# Patient Record
Sex: FEMALE | Race: WHITE | NOT HISPANIC OR LATINO | Employment: OTHER | URBAN - METROPOLITAN AREA
[De-identification: names, ages, dates, MRNs, and addresses within clinical notes are randomized per-mention and may not be internally consistent; named-entity substitution may affect disease eponyms.]

---

## 2017-02-07 ENCOUNTER — OFFICE VISIT - HEALTHEAST (OUTPATIENT)
Dept: SURGERY | Facility: CLINIC | Age: 62
End: 2017-02-07

## 2017-02-07 DIAGNOSIS — L72.3 INFECTED SEBACEOUS CYST: ICD-10-CM

## 2017-02-07 DIAGNOSIS — L08.9 INFECTED SEBACEOUS CYST: ICD-10-CM

## 2017-03-07 ENCOUNTER — COMMUNICATION - HEALTHEAST (OUTPATIENT)
Dept: SURGERY | Facility: CLINIC | Age: 62
End: 2017-03-07

## 2017-10-02 ENCOUNTER — RECORDS - HEALTHEAST (OUTPATIENT)
Dept: ADMINISTRATIVE | Facility: OTHER | Age: 62
End: 2017-10-02

## 2017-10-18 ENCOUNTER — HOSPITAL ENCOUNTER (OUTPATIENT)
Dept: CT IMAGING | Facility: CLINIC | Age: 62
Discharge: HOME | End: 2017-10-18
Attending: FAMILY MEDICINE

## 2017-10-18 ENCOUNTER — COMMUNICATION - HEALTHEAST (OUTPATIENT)
Dept: TELEHEALTH | Facility: CLINIC | Age: 62
End: 2017-10-18

## 2017-10-18 DIAGNOSIS — R91.1 PULMONARY NODULE: ICD-10-CM

## 2018-03-07 ENCOUNTER — RECORDS - HEALTHEAST (OUTPATIENT)
Dept: LAB | Facility: CLINIC | Age: 63
End: 2018-03-07

## 2018-03-07 LAB
ALBUMIN SERPL-MCNC: 3.5 G/DL (ref 3.5–5)
ALP SERPL-CCNC: 128 U/L (ref 45–120)
ALT SERPL W P-5'-P-CCNC: 20 U/L (ref 0–45)
ANION GAP SERPL CALCULATED.3IONS-SCNC: 7 MMOL/L (ref 5–18)
AST SERPL W P-5'-P-CCNC: 26 U/L (ref 0–40)
BILIRUB SERPL-MCNC: 0.4 MG/DL (ref 0–1)
BUN SERPL-MCNC: 10 MG/DL (ref 8–22)
CALCIUM SERPL-MCNC: 9.5 MG/DL (ref 8.5–10.5)
CHLORIDE BLD-SCNC: 108 MMOL/L (ref 98–107)
CHOLEST SERPL-MCNC: 144 MG/DL
CO2 SERPL-SCNC: 25 MMOL/L (ref 22–31)
CREAT SERPL-MCNC: 0.7 MG/DL (ref 0.6–1.1)
FASTING STATUS PATIENT QL REPORTED: YES
GFR SERPL CREATININE-BSD FRML MDRD: >60 ML/MIN/1.73M2
GLUCOSE BLD-MCNC: 140 MG/DL (ref 70–125)
HDLC SERPL-MCNC: 55 MG/DL
LDLC SERPL CALC-MCNC: 63 MG/DL
POTASSIUM BLD-SCNC: 4.6 MMOL/L (ref 3.5–5)
PROT SERPL-MCNC: 7.1 G/DL (ref 6–8)
SODIUM SERPL-SCNC: 140 MMOL/L (ref 136–145)
TRIGL SERPL-MCNC: 128 MG/DL

## 2018-03-20 ENCOUNTER — HOSPITAL ENCOUNTER (OUTPATIENT)
Dept: CARDIOLOGY | Facility: CLINIC | Age: 63
Discharge: HOME | End: 2018-03-20
Attending: FAMILY MEDICINE

## 2018-03-20 DIAGNOSIS — R93.1 AGATSTON CORONARY ARTERY CALCIUM SCORE BETWEEN 200 AND 399: ICD-10-CM

## 2018-03-20 LAB
CV STRESS CURRENT BP HE: NORMAL
CV STRESS CURRENT HR HE: 102
CV STRESS CURRENT HR HE: 106
CV STRESS CURRENT HR HE: 117
CV STRESS CURRENT HR HE: 117
CV STRESS CURRENT HR HE: 124
CV STRESS CURRENT HR HE: 124
CV STRESS CURRENT HR HE: 128
CV STRESS CURRENT HR HE: 132
CV STRESS CURRENT HR HE: 134
CV STRESS CURRENT HR HE: 134
CV STRESS CURRENT HR HE: 140
CV STRESS CURRENT HR HE: 142
CV STRESS CURRENT HR HE: 145
CV STRESS CURRENT HR HE: 149
CV STRESS CURRENT HR HE: 153
CV STRESS CURRENT HR HE: 155
CV STRESS CURRENT HR HE: 156
CV STRESS CURRENT HR HE: 156
CV STRESS CURRENT HR HE: 70
CV STRESS CURRENT HR HE: 70
CV STRESS CURRENT HR HE: 76
CV STRESS CURRENT HR HE: 78
CV STRESS CURRENT HR HE: 83
CV STRESS CURRENT HR HE: 86
CV STRESS CURRENT HR HE: 90
CV STRESS CURRENT HR HE: 94
CV STRESS CURRENT HR HE: 99
CV STRESS DEVIATION TIME HE: NORMAL
CV STRESS ECHO PERCENT HR HE: NORMAL
CV STRESS EXERCISE STAGE HE: NORMAL
CV STRESS FINAL RESTING BP HE: NORMAL
CV STRESS FINAL RESTING HR HE: 86
CV STRESS MAX HR HE: 156
CV STRESS MAX TREADMILL GRADE HE: 0
CV STRESS MAX TREADMILL SPEED HE: 0
CV STRESS PEAK DIA BP HE: NORMAL
CV STRESS PEAK SYS BP HE: NORMAL
CV STRESS PHASE HE: NORMAL
CV STRESS PROTOCOL HE: NORMAL
CV STRESS RESTING PT POSITION HE: NORMAL
CV STRESS RESTING PT POSITION HE: NORMAL
CV STRESS ST DEVIATION AMOUNT HE: NORMAL
CV STRESS ST DEVIATION ELEVATION HE: NORMAL
CV STRESS ST EVELATION AMOUNT HE: NORMAL
CV STRESS TEST TYPE HE: NORMAL
CV STRESS TOTAL STAGE TIME MIN 1 HE: NORMAL
STRESS ECHO BASELINE BP: NORMAL
STRESS ECHO BASELINE HR: 70
STRESS ECHO CALCULATED PERCENT HR: 99 %
STRESS ECHO LAST STRESS BP: NORMAL
STRESS ECHO LAST STRESS HR: 145
STRESS ECHO POST ESTIMATED WORKLOAD: 9.7
STRESS ECHO POST EXERCISE DUR MIN: 8
STRESS ECHO POST EXERCISE DUR SEC: 3
STRESS ECHO TARGET HR: 134

## 2018-07-31 ENCOUNTER — RECORDS - HEALTHEAST (OUTPATIENT)
Dept: LAB | Facility: CLINIC | Age: 63
End: 2018-07-31

## 2018-08-02 LAB — BACTERIA SPEC CULT: ABNORMAL

## 2019-01-08 ENCOUNTER — RECORDS - HEALTHEAST (OUTPATIENT)
Dept: LAB | Facility: CLINIC | Age: 64
End: 2019-01-08

## 2019-01-09 LAB
ALBUMIN SERPL-MCNC: 4 G/DL (ref 3.5–5)
ALP SERPL-CCNC: 94 U/L (ref 45–120)
ALT SERPL W P-5'-P-CCNC: 24 U/L (ref 0–45)
ANION GAP SERPL CALCULATED.3IONS-SCNC: 8 MMOL/L (ref 5–18)
AST SERPL W P-5'-P-CCNC: 29 U/L (ref 0–40)
BILIRUB SERPL-MCNC: 0.4 MG/DL (ref 0–1)
BUN SERPL-MCNC: 18 MG/DL (ref 8–22)
CALCIUM SERPL-MCNC: 10 MG/DL (ref 8.5–10.5)
CHLORIDE BLD-SCNC: 110 MMOL/L (ref 98–107)
CHOLEST SERPL-MCNC: 153 MG/DL
CO2 SERPL-SCNC: 25 MMOL/L (ref 22–31)
CREAT SERPL-MCNC: 0.77 MG/DL (ref 0.6–1.1)
FASTING STATUS PATIENT QL REPORTED: NORMAL
GFR SERPL CREATININE-BSD FRML MDRD: >60 ML/MIN/1.73M2
GLUCOSE BLD-MCNC: 98 MG/DL (ref 70–125)
HDLC SERPL-MCNC: 61 MG/DL
LDLC SERPL CALC-MCNC: 67 MG/DL
POTASSIUM BLD-SCNC: 4.7 MMOL/L (ref 3.5–5)
PROT SERPL-MCNC: 7 G/DL (ref 6–8)
SODIUM SERPL-SCNC: 143 MMOL/L (ref 136–145)
TRIGL SERPL-MCNC: 125 MG/DL

## 2019-01-25 ENCOUNTER — RECORDS - HEALTHEAST (OUTPATIENT)
Dept: LAB | Facility: CLINIC | Age: 64
End: 2019-01-25

## 2019-01-25 LAB — URATE SERPL-MCNC: 4.3 MG/DL (ref 2–7.5)

## 2019-10-08 ENCOUNTER — RECORDS - HEALTHEAST (OUTPATIENT)
Dept: LAB | Facility: CLINIC | Age: 64
End: 2019-10-08

## 2019-10-08 LAB
ALBUMIN SERPL-MCNC: 4 G/DL (ref 3.5–5)
ALP SERPL-CCNC: 85 U/L (ref 45–120)
ALT SERPL W P-5'-P-CCNC: 26 U/L (ref 0–45)
ANION GAP SERPL CALCULATED.3IONS-SCNC: 9 MMOL/L (ref 5–18)
AST SERPL W P-5'-P-CCNC: 26 U/L (ref 0–40)
BILIRUB SERPL-MCNC: 0.4 MG/DL (ref 0–1)
BUN SERPL-MCNC: 21 MG/DL (ref 8–22)
CALCIUM SERPL-MCNC: 9.7 MG/DL (ref 8.5–10.5)
CHLORIDE BLD-SCNC: 108 MMOL/L (ref 98–107)
CHOLEST SERPL-MCNC: 146 MG/DL
CO2 SERPL-SCNC: 22 MMOL/L (ref 22–31)
CREAT SERPL-MCNC: 0.82 MG/DL (ref 0.6–1.1)
FASTING STATUS PATIENT QL REPORTED: NO
GFR SERPL CREATININE-BSD FRML MDRD: >60 ML/MIN/1.73M2
GLUCOSE BLD-MCNC: 104 MG/DL (ref 70–125)
HDLC SERPL-MCNC: 62 MG/DL
LDLC SERPL CALC-MCNC: 65 MG/DL
POTASSIUM BLD-SCNC: 4.7 MMOL/L (ref 3.5–5)
PROT SERPL-MCNC: 6.9 G/DL (ref 6–8)
SODIUM SERPL-SCNC: 139 MMOL/L (ref 136–145)
TRIGL SERPL-MCNC: 97 MG/DL

## 2019-12-13 ENCOUNTER — RECORDS - HEALTHEAST (OUTPATIENT)
Dept: LAB | Facility: CLINIC | Age: 64
End: 2019-12-13

## 2019-12-13 LAB — MAGNESIUM SERPL-MCNC: 1.8 MG/DL (ref 1.8–2.6)

## 2020-04-29 ENCOUNTER — RECORDS - HEALTHEAST (OUTPATIENT)
Dept: LAB | Facility: CLINIC | Age: 65
End: 2020-04-29

## 2020-04-29 LAB
ANION GAP SERPL CALCULATED.3IONS-SCNC: 11 MMOL/L (ref 5–18)
BUN SERPL-MCNC: 13 MG/DL (ref 8–22)
CALCIUM SERPL-MCNC: 9.6 MG/DL (ref 8.5–10.5)
CHLORIDE BLD-SCNC: 104 MMOL/L (ref 98–107)
CO2 SERPL-SCNC: 25 MMOL/L (ref 22–31)
CREAT SERPL-MCNC: 0.71 MG/DL (ref 0.6–1.1)
GFR SERPL CREATININE-BSD FRML MDRD: >60 ML/MIN/1.73M2
GLUCOSE BLD-MCNC: 104 MG/DL (ref 70–125)
POTASSIUM BLD-SCNC: 4.9 MMOL/L (ref 3.5–5)
SODIUM SERPL-SCNC: 140 MMOL/L (ref 136–145)

## 2020-05-01 ENCOUNTER — RECORDS - HEALTHEAST (OUTPATIENT)
Dept: LAB | Facility: CLINIC | Age: 65
End: 2020-05-01

## 2020-05-01 LAB — MAGNESIUM SERPL-MCNC: 1.8 MG/DL (ref 1.8–2.6)

## 2020-11-05 ENCOUNTER — RECORDS - HEALTHEAST (OUTPATIENT)
Dept: LAB | Facility: CLINIC | Age: 65
End: 2020-11-05

## 2020-11-05 LAB
ALBUMIN SERPL-MCNC: 4.1 G/DL (ref 3.5–5)
ALP SERPL-CCNC: 75 U/L (ref 45–120)
ALT SERPL W P-5'-P-CCNC: 18 U/L (ref 0–45)
ANION GAP SERPL CALCULATED.3IONS-SCNC: 14 MMOL/L (ref 5–18)
AST SERPL W P-5'-P-CCNC: 23 U/L (ref 0–40)
BILIRUB SERPL-MCNC: 0.5 MG/DL (ref 0–1)
BUN SERPL-MCNC: 15 MG/DL (ref 8–22)
CALCIUM SERPL-MCNC: 9.8 MG/DL (ref 8.5–10.5)
CHLORIDE BLD-SCNC: 105 MMOL/L (ref 98–107)
CHOLEST SERPL-MCNC: 150 MG/DL
CO2 SERPL-SCNC: 22 MMOL/L (ref 22–31)
CREAT SERPL-MCNC: 0.76 MG/DL (ref 0.6–1.1)
FASTING STATUS PATIENT QL REPORTED: NORMAL
GFR SERPL CREATININE-BSD FRML MDRD: >60 ML/MIN/1.73M2
GLUCOSE BLD-MCNC: 113 MG/DL (ref 70–125)
HDLC SERPL-MCNC: 61 MG/DL
LDLC SERPL CALC-MCNC: 71 MG/DL
POTASSIUM BLD-SCNC: 4.6 MMOL/L (ref 3.5–5)
PROT SERPL-MCNC: 7.1 G/DL (ref 6–8)
SODIUM SERPL-SCNC: 141 MMOL/L (ref 136–145)
TRIGL SERPL-MCNC: 92 MG/DL
TSH SERPL DL<=0.005 MIU/L-ACNC: 1.59 UIU/ML (ref 0.3–5)
VIT B12 SERPL-MCNC: 235 PG/ML (ref 213–816)

## 2020-11-06 LAB — 25(OH)D3 SERPL-MCNC: 20.5 NG/ML (ref 30–80)

## 2021-03-19 ENCOUNTER — HOSPITAL ENCOUNTER (OUTPATIENT)
Dept: CT IMAGING | Facility: CLINIC | Age: 66
Discharge: HOME | End: 2021-03-19

## 2021-03-19 ENCOUNTER — AMBULATORY - HEALTHEAST (OUTPATIENT)
Dept: UROLOGY | Facility: CLINIC | Age: 66
End: 2021-03-19

## 2021-03-19 DIAGNOSIS — N20.0 CALCULUS OF KIDNEY: ICD-10-CM

## 2021-03-22 ENCOUNTER — OFFICE VISIT - HEALTHEAST (OUTPATIENT)
Dept: UROLOGY | Facility: CLINIC | Age: 66
End: 2021-03-22

## 2021-03-22 DIAGNOSIS — M54.50 ACUTE MIDLINE LOW BACK PAIN WITHOUT SCIATICA: ICD-10-CM

## 2021-05-07 ENCOUNTER — RECORDS - HEALTHEAST (OUTPATIENT)
Dept: LAB | Facility: CLINIC | Age: 66
End: 2021-05-07

## 2021-05-07 LAB
SARS-COV-2 PCR COMMENT: NORMAL
SARS-COV-2 RNA SPEC QL NAA+PROBE: NEGATIVE
SARS-COV-2 VIRUS SPECIMEN SOURCE: NORMAL

## 2021-05-24 ENCOUNTER — RECORDS - HEALTHEAST (OUTPATIENT)
Dept: ADMINISTRATIVE | Facility: CLINIC | Age: 66
End: 2021-05-24

## 2021-05-25 ENCOUNTER — RECORDS - HEALTHEAST (OUTPATIENT)
Dept: ADMINISTRATIVE | Facility: CLINIC | Age: 66
End: 2021-05-25

## 2021-05-26 ENCOUNTER — RECORDS - HEALTHEAST (OUTPATIENT)
Dept: ADMINISTRATIVE | Facility: OTHER | Age: 66
End: 2021-05-26

## 2021-05-26 ENCOUNTER — RECORDS - HEALTHEAST (OUTPATIENT)
Dept: LAB | Facility: CLINIC | Age: 66
End: 2021-05-26

## 2021-05-26 LAB
CREAT UR-MCNC: 107.4 MG/DL
MICROALBUMIN UR-MCNC: 0.79 MG/DL (ref 0–1.99)
MICROALBUMIN/CREAT UR: 7.4 MG/G

## 2021-05-27 DIAGNOSIS — G47.30 SLEEP APNEA: Primary | ICD-10-CM

## 2021-05-27 LAB — 25(OH)D3 SERPL-MCNC: 21.4 NG/ML (ref 30–80)

## 2021-05-29 ENCOUNTER — RECORDS - HEALTHEAST (OUTPATIENT)
Dept: ADMINISTRATIVE | Facility: CLINIC | Age: 66
End: 2021-05-29

## 2021-05-30 ENCOUNTER — RECORDS - HEALTHEAST (OUTPATIENT)
Dept: ADMINISTRATIVE | Facility: CLINIC | Age: 66
End: 2021-05-30

## 2021-05-30 VITALS — WEIGHT: 173 LBS | BODY MASS INDEX: 29.24 KG/M2

## 2021-05-31 ENCOUNTER — RECORDS - HEALTHEAST (OUTPATIENT)
Dept: ADMINISTRATIVE | Facility: CLINIC | Age: 66
End: 2021-05-31

## 2021-06-01 ENCOUNTER — RECORDS - HEALTHEAST (OUTPATIENT)
Dept: ADMINISTRATIVE | Facility: CLINIC | Age: 66
End: 2021-06-01

## 2021-06-02 ENCOUNTER — RECORDS - HEALTHEAST (OUTPATIENT)
Dept: ADMINISTRATIVE | Facility: CLINIC | Age: 66
End: 2021-06-02

## 2021-06-03 ENCOUNTER — RECORDS - HEALTHEAST (OUTPATIENT)
Dept: HEALTH INFORMATION MANAGEMENT | Facility: CLINIC | Age: 66
End: 2021-06-03

## 2021-06-08 NOTE — PROGRESS NOTES
This is a 61-year-old woman who I'm asked to see by Dr. Jay De La Fuente for evaluation of the cyst on her back.  The patient has had problems with infected sebaceous cysts in the past.  She developed a lump on her right back just below the shoulder blade recently.  It became very large but then drained on its own.  Since then however it has had persistent drainage.  The pain is much less but she can still feel a mass there also.    See chart review for PMH, Med list, allergies, FH and SH.    Review of systems is otherwise unremarkable and negative for full 12 point review of systems.    Physical exam:  Visit Vitals     /74 (Patient Site: Right Arm, Patient Position: Sitting, Cuff Size: Adult Regular)     Wt 173 lb (78.5 kg)     SpO2 97%     BMI 29.24 kg/m2     General: Healthy middle-aged woman in no acute distress.  Skin: On the right side of her back just below the shoulder blade is a palpable mass about 2 cm in width consistent with a sebaceous cyst.  Just adjacent to it is a small opening that is draining serous fluid.    Procedure: After verbal consent the area was prepped with Betadine and infiltrated with 1% lidocaine with epinephrine.  A transverse incision was made over the primary mass and extended to the opening.  Using sharp dissection a sebaceous cyst was able to be removed completely.  However there clearly was signs of infection and I didn't feel comfortable closing the wound primarily.  I close part of it with interrupted 4-0 Monocryl  to the subcuticular tissue.  I left the lateral portion open and packed this with a saline soaked gauze.  The entire size of the cyst was 2.5 cm in greatest dimension.    Impression: Infected sebaceous cyst completely excised.  Plan: Her  is going to do dressing changes.  He's done these in the past so she is comfortable that he will be able to do them.  She will follow up in 1-2 weeks with our physician assistant.  There is no gross signs of infection in the  surrounding tissue so I do not feel antibiotics are needed.

## 2021-06-16 NOTE — PROGRESS NOTES
Assessment/Plan:    Assessment & Plan   Jennifer was seen today for new problem - self referred.    Diagnoses and all orders for this visit:    Acute midline low back pain without sciatica        Stone Management Plan  [facility] 3/22/2021   Urinary Tract Infection No suspicion of infection   Renal Colic Asymptomatic at this time   Renal Failure No suspicion of renal failure   Current CT date 3/19/2021   Right sided stones? No   R Stone Event No current event   Left sided stones? No   L Stone Event No current event             PLAN    No follow-ups on file.    Phone call duration: 10 minutes  13 minutes spent on the date of the encounter doing chart review, history and exam, documentation and further activities as noted above    Simba Hernandez MD  [facility]    Subjective:      HPI  Ms. Jennifer Llanos is a 65 y.o.  female who is being evaluated via a billable telephone visit by [facility] for follow up of her stone disease.    She has been having some midline back pain over the past few weeks and has noticed some voiding changes. She is concerned that she may have a recurrent stone.    CT is personally reviewed. No evidence of stone. The radiologists discuss a spinal compression fracture which might explain her symptoms.    She will follow up with her PCP.       ROS   Review of systems is negative except for HPI.    Past Medical History:   Diagnosis Date     Diabetes mellitus (H)      High cholesterol      History of renal calculi      History of transfusion     1979     Hyperlipidemia      Kidney infection      Kidney stone        Past Surgical History:   Procedure Laterality Date     ABDOMINAL SURGERY      bariatric surgery       SECTION       GASTRECTOMY       HYSTERECTOMY         Current Outpatient Medications   Medication Sig Dispense Refill     amitriptyline (ELAVIL) 10 MG tablet Take 10 mg by mouth bedtime.        aspirin 81 mg chewable tablet Chew 81 mg every evening.       atorvastatin (LIPITOR) 40 MG tablet Take 40 mg by mouth bedtime.       omeprazole (PRILOSEC) 20 MG capsule TAKE 1 CAPSULE BY MOUTH TWICE DAILY 30 MINUTES TO 1 HOUR BEFORE A MEAL       sertraline (ZOLOFT) 50 MG tablet Take 50 mg by mouth every evening.        sitaGLIPtin (JANUVIA) 100 MG tablet Take 100 mg by mouth bedtime.       Current Facility-Administered Medications   Medication Dose Route Frequency Provider Last Rate Last Admin     denosumab 60 mg (PROLIA 60 mg/ml)  60 mg Subcutaneous Q6 Months Halina Killian MD   60 mg at 02/24/16 1511       Allergies   Allergen Reactions     Levofloxacin Unknown       Social History     Socioeconomic History     Marital status:      Spouse name: Not on file     Number of children: Not on file     Years of education: Not on file     Highest education level: Not on file   Occupational History     Not on file   Social Needs     Financial resource strain: Not on file     Food insecurity     Worry: Not on file     Inability: Not on file     Transportation needs     Medical: Not on file     Non-medical: Not on file   Tobacco Use     Smoking status: Current Every Day Smoker     Packs/day: 0.50     Years: 28.00     Pack years: 14.00   Substance and Sexual Activity     Alcohol use: Yes     Alcohol/week: 1.0 standard drinks     Types: 1 Cans of beer per week     Comment: Occasional     Drug use: No     Sexual activity: Yes     Partners: Male   Lifestyle     Physical activity     Days per week: Not on file     Minutes per session: Not on file     Stress: Not on file   Relationships     Social connections     Talks on phone: Not on file     Gets together: Not on file     Attends Mandaeism service: Not on file     Active member of club or organization: Not on file     Attends meetings of clubs or organizations: Not on file     Relationship status: Not on file     Intimate partner violence     Fear of current  or ex partner: Not on file     Emotionally abused: Not on file     Physically abused: Not on file     Forced sexual activity: Not on file   Other Topics Concern     Not on file   Social History Narrative    Presented to the ED with her daughter, son and  for flank pain 04/30/17       Family History   Problem Relation Age of Onset     Stroke Mother      CABG Father        Objective:      No vitals or physical exam obtained due to virtual visit  Labs   Urinalysis POC (Office):  Nitrite, UA   Date Value Ref Range Status   04/30/2017 Negative Negative Final   04/30/2017 Negative Negative Final   10/21/2010 Positive (!) (Negative) Final       Lab Urinalysis:  Blood, UA   Date Value Ref Range Status   04/30/2017 Moderate (!) Negative Final   04/30/2017 Negative Negative Final   10/21/2010 Large (!) (Negative) Final     Nitrite, UA   Date Value Ref Range Status   04/30/2017 Negative Negative Final   04/30/2017 Negative Negative Final   10/21/2010 Positive (!) (Negative) Final     Leukocytes, UA   Date Value Ref Range Status   04/30/2017 Negative Negative Final   04/30/2017 Large (!) Negative Final   10/21/2010 Small (!) (Negative) Final     pH, UA   Date Value Ref Range Status   04/30/2017 6.5 4.5 - 8.0 Final   04/30/2017 6.5 4.5 - 8.0 Final   10/27/2013 5.5 4.5 - 8.0 Final

## 2021-06-20 ENCOUNTER — HEALTH MAINTENANCE LETTER (OUTPATIENT)
Age: 66
End: 2021-06-20

## 2021-10-11 ENCOUNTER — HEALTH MAINTENANCE LETTER (OUTPATIENT)
Age: 66
End: 2021-10-11

## 2022-01-30 ENCOUNTER — HEALTH MAINTENANCE LETTER (OUTPATIENT)
Age: 67
End: 2022-01-30

## 2022-05-22 ENCOUNTER — HEALTH MAINTENANCE LETTER (OUTPATIENT)
Age: 67
End: 2022-05-22

## 2022-07-17 ENCOUNTER — HEALTH MAINTENANCE LETTER (OUTPATIENT)
Age: 67
End: 2022-07-17

## 2022-07-25 ENCOUNTER — LAB REQUISITION (OUTPATIENT)
Dept: LAB | Facility: CLINIC | Age: 67
End: 2022-07-25
Payer: MEDICARE

## 2022-07-25 DIAGNOSIS — E78.5 HYPERLIPIDEMIA, UNSPECIFIED: ICD-10-CM

## 2022-07-25 DIAGNOSIS — E55.9 VITAMIN D DEFICIENCY, UNSPECIFIED: ICD-10-CM

## 2022-07-25 LAB
ALBUMIN SERPL BCG-MCNC: 4.2 G/DL (ref 3.5–5.2)
ALP SERPL-CCNC: 84 U/L (ref 35–104)
ALT SERPL W P-5'-P-CCNC: 16 U/L (ref 10–35)
ANION GAP SERPL CALCULATED.3IONS-SCNC: 13 MMOL/L (ref 7–15)
AST SERPL W P-5'-P-CCNC: 32 U/L (ref 10–35)
BILIRUB SERPL-MCNC: 0.3 MG/DL
BUN SERPL-MCNC: 11.6 MG/DL (ref 8–23)
CALCIUM SERPL-MCNC: 9.2 MG/DL (ref 8.8–10.2)
CHLORIDE SERPL-SCNC: 106 MMOL/L (ref 98–107)
CREAT SERPL-MCNC: 0.61 MG/DL (ref 0.51–0.95)
DEPRECATED HCO3 PLAS-SCNC: 22 MMOL/L (ref 22–29)
GFR SERPL CREATININE-BSD FRML MDRD: >90 ML/MIN/1.73M2
GLUCOSE SERPL-MCNC: 118 MG/DL (ref 70–99)
POTASSIUM SERPL-SCNC: 4.9 MMOL/L (ref 3.4–5.3)
PROT SERPL-MCNC: 6.9 G/DL (ref 6.4–8.3)
SODIUM SERPL-SCNC: 141 MMOL/L (ref 136–145)

## 2022-07-25 PROCEDURE — 82306 VITAMIN D 25 HYDROXY: CPT | Mod: ORL | Performed by: PHYSICIAN ASSISTANT

## 2022-07-25 PROCEDURE — 80053 COMPREHEN METABOLIC PANEL: CPT | Mod: ORL | Performed by: PHYSICIAN ASSISTANT

## 2022-07-25 PROCEDURE — 80061 LIPID PANEL: CPT | Mod: ORL | Performed by: PHYSICIAN ASSISTANT

## 2022-07-26 LAB
CHOLEST SERPL-MCNC: 137 MG/DL
DEPRECATED CALCIDIOL+CALCIFEROL SERPL-MC: 24 UG/L (ref 20–75)
HDLC SERPL-MCNC: 58 MG/DL
LDLC SERPL CALC-MCNC: 61 MG/DL
NONHDLC SERPL-MCNC: 79 MG/DL
TRIGL SERPL-MCNC: 88 MG/DL

## 2022-08-11 ENCOUNTER — HOSPITAL ENCOUNTER (OUTPATIENT)
Dept: MAMMOGRAPHY | Facility: CLINIC | Age: 67
Discharge: HOME | End: 2022-08-11
Attending: PHYSICIAN ASSISTANT | Admitting: PHYSICIAN ASSISTANT
Payer: MEDICARE

## 2022-08-11 DIAGNOSIS — Z12.31 VISIT FOR SCREENING MAMMOGRAM: ICD-10-CM

## 2022-08-11 PROCEDURE — 77067 SCR MAMMO BI INCL CAD: CPT

## 2022-09-25 ENCOUNTER — HEALTH MAINTENANCE LETTER (OUTPATIENT)
Age: 67
End: 2022-09-25

## 2023-01-30 ENCOUNTER — HEALTH MAINTENANCE LETTER (OUTPATIENT)
Age: 68
End: 2023-01-30

## 2023-03-15 ENCOUNTER — LAB REQUISITION (OUTPATIENT)
Dept: LAB | Facility: CLINIC | Age: 68
End: 2023-03-15
Payer: MEDICARE

## 2023-03-15 DIAGNOSIS — K21.9 GASTRO-ESOPHAGEAL REFLUX DISEASE WITHOUT ESOPHAGITIS: ICD-10-CM

## 2023-03-15 LAB
ALBUMIN SERPL BCG-MCNC: 4.3 G/DL (ref 3.5–5.2)
ALP SERPL-CCNC: 86 U/L (ref 35–104)
ALT SERPL W P-5'-P-CCNC: 17 U/L (ref 10–35)
ANION GAP SERPL CALCULATED.3IONS-SCNC: 11 MMOL/L (ref 7–15)
AST SERPL W P-5'-P-CCNC: 28 U/L (ref 10–35)
BILIRUB SERPL-MCNC: 0.3 MG/DL
BUN SERPL-MCNC: 14.4 MG/DL (ref 8–23)
CALCIUM SERPL-MCNC: 9.9 MG/DL (ref 8.8–10.2)
CHLORIDE SERPL-SCNC: 107 MMOL/L (ref 98–107)
CREAT SERPL-MCNC: 0.66 MG/DL (ref 0.51–0.95)
DEPRECATED HCO3 PLAS-SCNC: 25 MMOL/L (ref 22–29)
GFR SERPL CREATININE-BSD FRML MDRD: >90 ML/MIN/1.73M2
GLUCOSE SERPL-MCNC: 128 MG/DL (ref 70–99)
POTASSIUM SERPL-SCNC: 4.9 MMOL/L (ref 3.4–5.3)
PROT SERPL-MCNC: 7.3 G/DL (ref 6.4–8.3)
SODIUM SERPL-SCNC: 143 MMOL/L (ref 136–145)

## 2023-03-15 PROCEDURE — 80053 COMPREHEN METABOLIC PANEL: CPT | Mod: ORL | Performed by: STUDENT IN AN ORGANIZED HEALTH CARE EDUCATION/TRAINING PROGRAM

## 2023-05-13 ENCOUNTER — HEALTH MAINTENANCE LETTER (OUTPATIENT)
Age: 68
End: 2023-05-13

## 2023-07-23 ENCOUNTER — TRANSFERRED RECORDS (OUTPATIENT)
Dept: HEALTH INFORMATION MANAGEMENT | Facility: CLINIC | Age: 68
End: 2023-07-23
Payer: MEDICARE

## 2023-07-23 ENCOUNTER — MEDICAL CORRESPONDENCE (OUTPATIENT)
Dept: HEALTH INFORMATION MANAGEMENT | Facility: CLINIC | Age: 68
End: 2023-07-23
Payer: MEDICARE

## 2023-07-24 ENCOUNTER — APPOINTMENT (OUTPATIENT)
Dept: GENERAL RADIOLOGY | Facility: CLINIC | Age: 68
DRG: 482 | End: 2023-07-24
Payer: MEDICARE

## 2023-07-24 ENCOUNTER — APPOINTMENT (OUTPATIENT)
Dept: GENERAL RADIOLOGY | Facility: CLINIC | Age: 68
DRG: 482 | End: 2023-07-24
Attending: STUDENT IN AN ORGANIZED HEALTH CARE EDUCATION/TRAINING PROGRAM
Payer: MEDICARE

## 2023-07-24 ENCOUNTER — ANESTHESIA EVENT (OUTPATIENT)
Dept: SURGERY | Facility: CLINIC | Age: 68
DRG: 482 | End: 2023-07-24
Payer: MEDICARE

## 2023-07-24 ENCOUNTER — ANESTHESIA (OUTPATIENT)
Dept: SURGERY | Facility: CLINIC | Age: 68
DRG: 482 | End: 2023-07-24
Payer: MEDICARE

## 2023-07-24 ENCOUNTER — HOSPITAL ENCOUNTER (INPATIENT)
Facility: CLINIC | Age: 68
LOS: 4 days | Discharge: NURSING, REHAB OR LONG-TERM CARE | DRG: 482 | End: 2023-07-28
Attending: INTERNAL MEDICINE | Admitting: INTERNAL MEDICINE
Payer: MEDICARE

## 2023-07-24 ENCOUNTER — TRANSFERRED RECORDS (OUTPATIENT)
Dept: HEALTH INFORMATION MANAGEMENT | Facility: CLINIC | Age: 68
End: 2023-07-24

## 2023-07-24 DIAGNOSIS — S72.002A CLOSED FRACTURE OF LEFT HIP, INITIAL ENCOUNTER (H): Primary | ICD-10-CM

## 2023-07-24 PROBLEM — S72.009A HIP FRACTURE (H): Status: ACTIVE | Noted: 2023-07-24

## 2023-07-24 LAB
ABO/RH(D): NORMAL
ANION GAP SERPL CALCULATED.3IONS-SCNC: 10 MMOL/L (ref 7–15)
ANTIBODY SCREEN: NEGATIVE
BLD PROD TYP BPU: NORMAL
BLOOD COMPONENT TYPE: NORMAL
BUN SERPL-MCNC: 14.2 MG/DL (ref 8–23)
CALCIUM SERPL-MCNC: 8.4 MG/DL (ref 8.8–10.2)
CHLORIDE SERPL-SCNC: 110 MMOL/L (ref 98–107)
CODING SYSTEM: NORMAL
CREAT SERPL-MCNC: 0.48 MG/DL (ref 0.51–0.95)
CROSSMATCH: NORMAL
DEPRECATED CALCIDIOL+CALCIFEROL SERPL-MC: 16 UG/L (ref 20–75)
DEPRECATED HCO3 PLAS-SCNC: 22 MMOL/L (ref 22–29)
ERYTHROCYTE [DISTWIDTH] IN BLOOD BY AUTOMATED COUNT: 19.7 % (ref 10–15)
FERRITIN SERPL-MCNC: 9 NG/ML (ref 11–328)
GFR SERPL CREATININE-BSD FRML MDRD: >90 ML/MIN/1.73M2
GLUCOSE BLDC GLUCOMTR-MCNC: 116 MG/DL (ref 70–99)
GLUCOSE BLDC GLUCOMTR-MCNC: 126 MG/DL (ref 70–99)
GLUCOSE BLDC GLUCOMTR-MCNC: 129 MG/DL (ref 70–99)
GLUCOSE BLDC GLUCOMTR-MCNC: 150 MG/DL (ref 70–99)
GLUCOSE BLDC GLUCOMTR-MCNC: 217 MG/DL (ref 70–99)
GLUCOSE SERPL-MCNC: 115 MG/DL (ref 70–99)
HBA1C MFR BLD: 6.9 %
HCT VFR BLD AUTO: 27 % (ref 35–47)
HGB BLD-MCNC: 7.8 G/DL (ref 11.7–15.7)
IRON BINDING CAPACITY (ROCHE): 420 UG/DL (ref 240–430)
IRON SATN MFR SERPL: 5 % (ref 15–46)
IRON SERPL-MCNC: 19 UG/DL (ref 37–145)
ISSUE DATE AND TIME: NORMAL
MCH RBC QN AUTO: 21.1 PG (ref 26.5–33)
MCHC RBC AUTO-ENTMCNC: 28.9 G/DL (ref 31.5–36.5)
MCV RBC AUTO: 73 FL (ref 78–100)
PLATELET # BLD AUTO: 225 10E3/UL (ref 150–450)
POTASSIUM SERPL-SCNC: 4.1 MMOL/L (ref 3.4–5.3)
RBC # BLD AUTO: 3.7 10E6/UL (ref 3.8–5.2)
SODIUM SERPL-SCNC: 142 MMOL/L (ref 136–145)
SPECIMEN EXPIRATION DATE: NORMAL
UNIT ABO/RH: NORMAL
UNIT NUMBER: NORMAL
UNIT STATUS: NORMAL
UNIT TYPE ISBT: 5100
WBC # BLD AUTO: 8.1 10E3/UL (ref 4–11)

## 2023-07-24 PROCEDURE — 272N000001 HC OR GENERAL SUPPLY STERILE: Performed by: STUDENT IN AN ORGANIZED HEALTH CARE EDUCATION/TRAINING PROGRAM

## 2023-07-24 PROCEDURE — 250N000013 HC RX MED GY IP 250 OP 250 PS 637: Performed by: INTERNAL MEDICINE

## 2023-07-24 PROCEDURE — 82728 ASSAY OF FERRITIN: CPT | Performed by: INTERNAL MEDICINE

## 2023-07-24 PROCEDURE — 250N000009 HC RX 250: Performed by: ANESTHESIOLOGY

## 2023-07-24 PROCEDURE — 86850 RBC ANTIBODY SCREEN: CPT | Performed by: ANESTHESIOLOGY

## 2023-07-24 PROCEDURE — 250N000011 HC RX IP 250 OP 636: Performed by: ANESTHESIOLOGY

## 2023-07-24 PROCEDURE — 250N000011 HC RX IP 250 OP 636: Performed by: NURSE ANESTHETIST, CERTIFIED REGISTERED

## 2023-07-24 PROCEDURE — 250N000011 HC RX IP 250 OP 636: Mod: JZ | Performed by: INTERNAL MEDICINE

## 2023-07-24 PROCEDURE — 250N000013 HC RX MED GY IP 250 OP 250 PS 637: Performed by: HOSPITALIST

## 2023-07-24 PROCEDURE — 250N000013 HC RX MED GY IP 250 OP 250 PS 637

## 2023-07-24 PROCEDURE — 86901 BLOOD TYPING SEROLOGIC RH(D): CPT | Performed by: ANESTHESIOLOGY

## 2023-07-24 PROCEDURE — 83036 HEMOGLOBIN GLYCOSYLATED A1C: CPT | Performed by: INTERNAL MEDICINE

## 2023-07-24 PROCEDURE — 82306 VITAMIN D 25 HYDROXY: CPT | Performed by: PHYSICIAN ASSISTANT

## 2023-07-24 PROCEDURE — P9040 RBC LEUKOREDUCED IRRADIATED: HCPCS

## 2023-07-24 PROCEDURE — 370N000017 HC ANESTHESIA TECHNICAL FEE, PER MIN: Performed by: STUDENT IN AN ORGANIZED HEALTH CARE EDUCATION/TRAINING PROGRAM

## 2023-07-24 PROCEDURE — 258N000003 HC RX IP 258 OP 636: Performed by: NURSE ANESTHETIST, CERTIFIED REGISTERED

## 2023-07-24 PROCEDURE — C1769 GUIDE WIRE: HCPCS | Performed by: STUDENT IN AN ORGANIZED HEALTH CARE EDUCATION/TRAINING PROGRAM

## 2023-07-24 PROCEDURE — 86923 COMPATIBILITY TEST ELECTRIC: CPT | Performed by: HOSPITALIST

## 2023-07-24 PROCEDURE — 73552 X-RAY EXAM OF FEMUR 2/>: CPT | Mod: LT

## 2023-07-24 PROCEDURE — 93010 ELECTROCARDIOGRAM REPORT: CPT | Performed by: INTERNAL MEDICINE

## 2023-07-24 PROCEDURE — 250N000009 HC RX 250: Performed by: PHYSICIAN ASSISTANT

## 2023-07-24 PROCEDURE — 250N000011 HC RX IP 250 OP 636: Performed by: PHYSICIAN ASSISTANT

## 2023-07-24 PROCEDURE — 258N000003 HC RX IP 258 OP 636

## 2023-07-24 PROCEDURE — 250N000011 HC RX IP 250 OP 636: Mod: JZ | Performed by: ANESTHESIOLOGY

## 2023-07-24 PROCEDURE — 250N000025 HC SEVOFLURANE, PER MIN: Performed by: STUDENT IN AN ORGANIZED HEALTH CARE EDUCATION/TRAINING PROGRAM

## 2023-07-24 PROCEDURE — 250N000009 HC RX 250: Performed by: NURSE ANESTHETIST, CERTIFIED REGISTERED

## 2023-07-24 PROCEDURE — 93005 ELECTROCARDIOGRAM TRACING: CPT

## 2023-07-24 PROCEDURE — 0QH736Z INSERTION OF INTRAMEDULLARY INTERNAL FIXATION DEVICE INTO LEFT UPPER FEMUR, PERCUTANEOUS APPROACH: ICD-10-PCS | Performed by: STUDENT IN AN ORGANIZED HEALTH CARE EDUCATION/TRAINING PROGRAM

## 2023-07-24 PROCEDURE — 710N000009 HC RECOVERY PHASE 1, LEVEL 1, PER MIN: Performed by: STUDENT IN AN ORGANIZED HEALTH CARE EDUCATION/TRAINING PROGRAM

## 2023-07-24 PROCEDURE — 258N000003 HC RX IP 258 OP 636: Performed by: INTERNAL MEDICINE

## 2023-07-24 PROCEDURE — 73502 X-RAY EXAM HIP UNI 2-3 VIEWS: CPT

## 2023-07-24 PROCEDURE — 250N000009 HC RX 250: Performed by: STUDENT IN AN ORGANIZED HEALTH CARE EDUCATION/TRAINING PROGRAM

## 2023-07-24 PROCEDURE — 82310 ASSAY OF CALCIUM: CPT | Performed by: INTERNAL MEDICINE

## 2023-07-24 PROCEDURE — 83550 IRON BINDING TEST: CPT | Performed by: INTERNAL MEDICINE

## 2023-07-24 PROCEDURE — 999N000141 HC STATISTIC PRE-PROCEDURE NURSING ASSESSMENT: Performed by: STUDENT IN AN ORGANIZED HEALTH CARE EDUCATION/TRAINING PROGRAM

## 2023-07-24 PROCEDURE — 999N000179 XR SURGERY CARM FLUORO LESS THAN 5 MIN W STILLS

## 2023-07-24 PROCEDURE — P9040 RBC LEUKOREDUCED IRRADIATED: HCPCS | Performed by: HOSPITALIST

## 2023-07-24 PROCEDURE — 250N000012 HC RX MED GY IP 250 OP 636 PS 637: Performed by: INTERNAL MEDICINE

## 2023-07-24 PROCEDURE — 36415 COLL VENOUS BLD VENIPUNCTURE: CPT | Performed by: INTERNAL MEDICINE

## 2023-07-24 PROCEDURE — 99223 1ST HOSP IP/OBS HIGH 75: CPT | Mod: AI | Performed by: INTERNAL MEDICINE

## 2023-07-24 PROCEDURE — C1713 ANCHOR/SCREW BN/BN,TIS/BN: HCPCS | Performed by: STUDENT IN AN ORGANIZED HEALTH CARE EDUCATION/TRAINING PROGRAM

## 2023-07-24 PROCEDURE — 360N000084 HC SURGERY LEVEL 4 W/ FLUORO, PER MIN: Performed by: STUDENT IN AN ORGANIZED HEALTH CARE EDUCATION/TRAINING PROGRAM

## 2023-07-24 PROCEDURE — 258N000003 HC RX IP 258 OP 636: Performed by: ANESTHESIOLOGY

## 2023-07-24 PROCEDURE — 85027 COMPLETE CBC AUTOMATED: CPT | Performed by: ANESTHESIOLOGY

## 2023-07-24 PROCEDURE — 120N000001 HC R&B MED SURG/OB

## 2023-07-24 DEVICE — SCREW BN 36MM 5MM LCK X25 IM NL 04.045.036S: Type: IMPLANTABLE DEVICE | Site: HIP | Status: FUNCTIONAL

## 2023-07-24 DEVICE — IMP NAIL SYN CAN FEM PROX TFNA 10X170MM 130D 04.037.042S: Type: IMPLANTABLE DEVICE | Site: HIP | Status: FUNCTIONAL

## 2023-07-24 DEVICE — IMP SCR SYN TFNA FENESTRATED LAG 105MM 04.038.205S: Type: IMPLANTABLE DEVICE | Site: HIP | Status: FUNCTIONAL

## 2023-07-24 RX ORDER — HYDROMORPHONE HYDROCHLORIDE 1 MG/ML
INJECTION, SOLUTION INTRAMUSCULAR; INTRAVENOUS; SUBCUTANEOUS PRN
Status: DISCONTINUED | OUTPATIENT
Start: 2023-07-24 | End: 2023-07-24

## 2023-07-24 RX ORDER — OMEPRAZOLE 40 MG/1
40 CAPSULE, DELAYED RELEASE ORAL DAILY PRN
Status: ON HOLD | COMMUNITY
End: 2023-07-28

## 2023-07-24 RX ORDER — HYDROMORPHONE HCL IN WATER/PF 6 MG/30 ML
0.4 PATIENT CONTROLLED ANALGESIA SYRINGE INTRAVENOUS EVERY 5 MIN PRN
Status: DISCONTINUED | OUTPATIENT
Start: 2023-07-24 | End: 2023-07-24 | Stop reason: HOSPADM

## 2023-07-24 RX ORDER — METHOCARBAMOL 500 MG/1
500 TABLET, FILM COATED ORAL EVERY 6 HOURS PRN
Status: DISCONTINUED | OUTPATIENT
Start: 2023-07-24 | End: 2023-07-28 | Stop reason: HOSPADM

## 2023-07-24 RX ORDER — SODIUM CHLORIDE, SODIUM LACTATE, POTASSIUM CHLORIDE, CALCIUM CHLORIDE 600; 310; 30; 20 MG/100ML; MG/100ML; MG/100ML; MG/100ML
INJECTION, SOLUTION INTRAVENOUS CONTINUOUS
Status: DISCONTINUED | OUTPATIENT
Start: 2023-07-24 | End: 2023-07-24 | Stop reason: HOSPADM

## 2023-07-24 RX ORDER — BISACODYL 10 MG
10 SUPPOSITORY, RECTAL RECTAL DAILY PRN
Status: DISCONTINUED | OUTPATIENT
Start: 2023-07-24 | End: 2023-07-24

## 2023-07-24 RX ORDER — NALOXONE HYDROCHLORIDE 0.4 MG/ML
0.2 INJECTION, SOLUTION INTRAMUSCULAR; INTRAVENOUS; SUBCUTANEOUS
Status: DISCONTINUED | OUTPATIENT
Start: 2023-07-24 | End: 2023-07-28 | Stop reason: HOSPADM

## 2023-07-24 RX ORDER — OXYCODONE HYDROCHLORIDE 5 MG/1
10 TABLET ORAL EVERY 4 HOURS PRN
Status: DISCONTINUED | OUTPATIENT
Start: 2023-07-24 | End: 2023-07-28 | Stop reason: HOSPADM

## 2023-07-24 RX ORDER — OXYCODONE HYDROCHLORIDE 5 MG/1
5 TABLET ORAL EVERY 4 HOURS PRN
Status: DISCONTINUED | OUTPATIENT
Start: 2023-07-24 | End: 2023-07-24

## 2023-07-24 RX ORDER — LIDOCAINE 40 MG/G
CREAM TOPICAL
Status: DISCONTINUED | OUTPATIENT
Start: 2023-07-24 | End: 2023-07-28 | Stop reason: HOSPADM

## 2023-07-24 RX ORDER — ENOXAPARIN SODIUM 100 MG/ML
40 INJECTION SUBCUTANEOUS EVERY 24 HOURS
Status: DISCONTINUED | OUTPATIENT
Start: 2023-07-25 | End: 2023-07-28 | Stop reason: HOSPADM

## 2023-07-24 RX ORDER — NALOXONE HYDROCHLORIDE 0.4 MG/ML
0.4 INJECTION, SOLUTION INTRAMUSCULAR; INTRAVENOUS; SUBCUTANEOUS
Status: DISCONTINUED | OUTPATIENT
Start: 2023-07-24 | End: 2023-07-28 | Stop reason: HOSPADM

## 2023-07-24 RX ORDER — CEFAZOLIN SODIUM 1 G/3ML
1 INJECTION, POWDER, FOR SOLUTION INTRAMUSCULAR; INTRAVENOUS EVERY 8 HOURS
Status: COMPLETED | OUTPATIENT
Start: 2023-07-25 | End: 2023-07-25

## 2023-07-24 RX ORDER — ONDANSETRON 2 MG/ML
4 INJECTION INTRAMUSCULAR; INTRAVENOUS EVERY 6 HOURS PRN
Status: DISCONTINUED | OUTPATIENT
Start: 2023-07-24 | End: 2023-07-24

## 2023-07-24 RX ORDER — HYDROMORPHONE HCL IN WATER/PF 6 MG/30 ML
0.2 PATIENT CONTROLLED ANALGESIA SYRINGE INTRAVENOUS
Status: DISCONTINUED | OUTPATIENT
Start: 2023-07-24 | End: 2023-07-28 | Stop reason: HOSPADM

## 2023-07-24 RX ORDER — MAGNESIUM HYDROXIDE 1200 MG/15ML
LIQUID ORAL PRN
Status: DISCONTINUED | OUTPATIENT
Start: 2023-07-24 | End: 2023-07-24 | Stop reason: HOSPADM

## 2023-07-24 RX ORDER — POLYETHYLENE GLYCOL 3350 17 G/17G
17 POWDER, FOR SOLUTION ORAL DAILY PRN
Status: DISCONTINUED | OUTPATIENT
Start: 2023-07-24 | End: 2023-07-28 | Stop reason: HOSPADM

## 2023-07-24 RX ORDER — ACETAMINOPHEN 325 MG/1
650 TABLET ORAL EVERY 6 HOURS PRN
Status: DISCONTINUED | OUTPATIENT
Start: 2023-07-24 | End: 2023-07-24

## 2023-07-24 RX ORDER — SODIUM CHLORIDE, SODIUM LACTATE, POTASSIUM CHLORIDE, CALCIUM CHLORIDE 600; 310; 30; 20 MG/100ML; MG/100ML; MG/100ML; MG/100ML
INJECTION, SOLUTION INTRAVENOUS CONTINUOUS
Status: DISCONTINUED | OUTPATIENT
Start: 2023-07-24 | End: 2023-07-25

## 2023-07-24 RX ORDER — LIDOCAINE 40 MG/G
CREAM TOPICAL
Status: DISCONTINUED | OUTPATIENT
Start: 2023-07-24 | End: 2023-07-24 | Stop reason: HOSPADM

## 2023-07-24 RX ORDER — AMITRIPTYLINE HYDROCHLORIDE 10 MG/1
10 TABLET ORAL AT BEDTIME
Status: DISCONTINUED | OUTPATIENT
Start: 2023-07-24 | End: 2023-07-28 | Stop reason: HOSPADM

## 2023-07-24 RX ORDER — FENTANYL CITRATE 50 UG/ML
INJECTION, SOLUTION INTRAMUSCULAR; INTRAVENOUS PRN
Status: DISCONTINUED | OUTPATIENT
Start: 2023-07-24 | End: 2023-07-24

## 2023-07-24 RX ORDER — ONDANSETRON 2 MG/ML
4 INJECTION INTRAMUSCULAR; INTRAVENOUS EVERY 6 HOURS PRN
Status: DISCONTINUED | OUTPATIENT
Start: 2023-07-24 | End: 2023-07-28 | Stop reason: HOSPADM

## 2023-07-24 RX ORDER — HYDROMORPHONE HCL IN WATER/PF 6 MG/30 ML
0.2 PATIENT CONTROLLED ANALGESIA SYRINGE INTRAVENOUS
Status: DISCONTINUED | OUTPATIENT
Start: 2023-07-24 | End: 2023-07-24

## 2023-07-24 RX ORDER — IBUPROFEN 600 MG/1
600 TABLET, FILM COATED ORAL EVERY 6 HOURS PRN
Status: DISCONTINUED | OUTPATIENT
Start: 2023-07-24 | End: 2023-07-28 | Stop reason: HOSPADM

## 2023-07-24 RX ORDER — BISACODYL 10 MG
10 SUPPOSITORY, RECTAL RECTAL DAILY PRN
Status: DISCONTINUED | OUTPATIENT
Start: 2023-07-24 | End: 2023-07-28 | Stop reason: HOSPADM

## 2023-07-24 RX ORDER — ATORVASTATIN CALCIUM 40 MG/1
40 TABLET, FILM COATED ORAL AT BEDTIME
Status: DISCONTINUED | OUTPATIENT
Start: 2023-07-24 | End: 2023-07-28 | Stop reason: HOSPADM

## 2023-07-24 RX ORDER — ACETAMINOPHEN 325 MG/1
975 TABLET ORAL 3 TIMES DAILY
Status: DISCONTINUED | OUTPATIENT
Start: 2023-07-24 | End: 2023-07-24

## 2023-07-24 RX ORDER — PROPOFOL 10 MG/ML
INJECTION, EMULSION INTRAVENOUS PRN
Status: DISCONTINUED | OUTPATIENT
Start: 2023-07-24 | End: 2023-07-24

## 2023-07-24 RX ORDER — SODIUM CHLORIDE, SODIUM LACTATE, POTASSIUM CHLORIDE, CALCIUM CHLORIDE 600; 310; 30; 20 MG/100ML; MG/100ML; MG/100ML; MG/100ML
INJECTION, SOLUTION INTRAVENOUS CONTINUOUS
Status: DISCONTINUED | OUTPATIENT
Start: 2023-07-24 | End: 2023-07-24

## 2023-07-24 RX ORDER — OXYCODONE HYDROCHLORIDE 5 MG/1
5 TABLET ORAL EVERY 4 HOURS PRN
Status: DISCONTINUED | OUTPATIENT
Start: 2023-07-24 | End: 2023-07-28 | Stop reason: HOSPADM

## 2023-07-24 RX ORDER — PROCHLORPERAZINE MALEATE 5 MG
5 TABLET ORAL EVERY 6 HOURS PRN
Status: DISCONTINUED | OUTPATIENT
Start: 2023-07-24 | End: 2023-07-24

## 2023-07-24 RX ORDER — ONDANSETRON 2 MG/ML
INJECTION INTRAMUSCULAR; INTRAVENOUS PRN
Status: DISCONTINUED | OUTPATIENT
Start: 2023-07-24 | End: 2023-07-24

## 2023-07-24 RX ORDER — CEFAZOLIN SODIUM/WATER 2 G/20 ML
2 SYRINGE (ML) INTRAVENOUS
Status: COMPLETED | OUTPATIENT
Start: 2023-07-24 | End: 2023-07-24

## 2023-07-24 RX ORDER — FENTANYL CITRATE 0.05 MG/ML
50 INJECTION, SOLUTION INTRAMUSCULAR; INTRAVENOUS EVERY 5 MIN PRN
Status: DISCONTINUED | OUTPATIENT
Start: 2023-07-24 | End: 2023-07-24 | Stop reason: HOSPADM

## 2023-07-24 RX ORDER — ACETAMINOPHEN 325 MG/1
650 TABLET ORAL EVERY 4 HOURS PRN
Status: DISCONTINUED | OUTPATIENT
Start: 2023-07-27 | End: 2023-07-28 | Stop reason: HOSPADM

## 2023-07-24 RX ORDER — ASPIRIN 81 MG/1
81 TABLET, CHEWABLE ORAL EVERY OTHER DAY
Status: DISCONTINUED | OUTPATIENT
Start: 2023-07-24 | End: 2023-07-28 | Stop reason: HOSPADM

## 2023-07-24 RX ORDER — ACETAMINOPHEN 650 MG/1
650 SUPPOSITORY RECTAL EVERY 6 HOURS PRN
Status: DISCONTINUED | OUTPATIENT
Start: 2023-07-24 | End: 2023-07-24

## 2023-07-24 RX ORDER — EPHEDRINE SULFATE 50 MG/ML
INJECTION, SOLUTION INTRAMUSCULAR; INTRAVENOUS; SUBCUTANEOUS PRN
Status: DISCONTINUED | OUTPATIENT
Start: 2023-07-24 | End: 2023-07-24

## 2023-07-24 RX ORDER — NICOTINE POLACRILEX 4 MG
15-30 LOZENGE BUCCAL
Status: DISCONTINUED | OUTPATIENT
Start: 2023-07-24 | End: 2023-07-28 | Stop reason: HOSPADM

## 2023-07-24 RX ORDER — TRANEXAMIC ACID 10 MG/ML
1 INJECTION, SOLUTION INTRAVENOUS ONCE
Status: COMPLETED | OUTPATIENT
Start: 2023-07-24 | End: 2023-07-24

## 2023-07-24 RX ORDER — ACETAMINOPHEN 325 MG/1
975 TABLET ORAL EVERY 8 HOURS
Status: COMPLETED | OUTPATIENT
Start: 2023-07-24 | End: 2023-07-27

## 2023-07-24 RX ORDER — HYDROMORPHONE HCL IN WATER/PF 6 MG/30 ML
0.4 PATIENT CONTROLLED ANALGESIA SYRINGE INTRAVENOUS
Status: DISCONTINUED | OUTPATIENT
Start: 2023-07-24 | End: 2023-07-24

## 2023-07-24 RX ORDER — ONDANSETRON 4 MG/1
4 TABLET, ORALLY DISINTEGRATING ORAL EVERY 30 MIN PRN
Status: DISCONTINUED | OUTPATIENT
Start: 2023-07-24 | End: 2023-07-24 | Stop reason: HOSPADM

## 2023-07-24 RX ORDER — VITAMIN B COMPLEX
50 TABLET ORAL DAILY
Status: DISCONTINUED | OUTPATIENT
Start: 2023-07-25 | End: 2023-07-28 | Stop reason: HOSPADM

## 2023-07-24 RX ORDER — LIDOCAINE 40 MG/G
CREAM TOPICAL
Status: DISCONTINUED | OUTPATIENT
Start: 2023-07-24 | End: 2023-07-25

## 2023-07-24 RX ORDER — METHOCARBAMOL 500 MG/1
500 TABLET, FILM COATED ORAL 4 TIMES DAILY PRN
Status: DISCONTINUED | OUTPATIENT
Start: 2023-07-24 | End: 2023-07-24

## 2023-07-24 RX ORDER — FENTANYL CITRATE 0.05 MG/ML
25 INJECTION, SOLUTION INTRAMUSCULAR; INTRAVENOUS EVERY 5 MIN PRN
Status: DISCONTINUED | OUTPATIENT
Start: 2023-07-24 | End: 2023-07-24 | Stop reason: HOSPADM

## 2023-07-24 RX ORDER — DEXTROSE MONOHYDRATE 25 G/50ML
25-50 INJECTION, SOLUTION INTRAVENOUS
Status: DISCONTINUED | OUTPATIENT
Start: 2023-07-24 | End: 2023-07-28 | Stop reason: HOSPADM

## 2023-07-24 RX ORDER — CYCLOBENZAPRINE HCL 10 MG
10 TABLET ORAL 3 TIMES DAILY
Status: DISCONTINUED | OUTPATIENT
Start: 2023-07-24 | End: 2023-07-28 | Stop reason: HOSPADM

## 2023-07-24 RX ORDER — HYDROMORPHONE HCL IN WATER/PF 6 MG/30 ML
0.4 PATIENT CONTROLLED ANALGESIA SYRINGE INTRAVENOUS
Status: DISCONTINUED | OUTPATIENT
Start: 2023-07-24 | End: 2023-07-28 | Stop reason: HOSPADM

## 2023-07-24 RX ORDER — PROPOFOL 10 MG/ML
INJECTION, EMULSION INTRAVENOUS CONTINUOUS PRN
Status: DISCONTINUED | OUTPATIENT
Start: 2023-07-24 | End: 2023-07-24

## 2023-07-24 RX ORDER — ONDANSETRON 2 MG/ML
4 INJECTION INTRAMUSCULAR; INTRAVENOUS EVERY 30 MIN PRN
Status: DISCONTINUED | OUTPATIENT
Start: 2023-07-24 | End: 2023-07-24 | Stop reason: HOSPADM

## 2023-07-24 RX ORDER — PANTOPRAZOLE SODIUM 40 MG/1
40 TABLET, DELAYED RELEASE ORAL EVERY MORNING
Status: DISCONTINUED | OUTPATIENT
Start: 2023-07-24 | End: 2023-07-28 | Stop reason: HOSPADM

## 2023-07-24 RX ORDER — ONDANSETRON 4 MG/1
4 TABLET, ORALLY DISINTEGRATING ORAL EVERY 6 HOURS PRN
Status: DISCONTINUED | OUTPATIENT
Start: 2023-07-24 | End: 2023-07-24

## 2023-07-24 RX ORDER — ONDANSETRON 4 MG/1
4 TABLET, ORALLY DISINTEGRATING ORAL EVERY 6 HOURS PRN
Status: DISCONTINUED | OUTPATIENT
Start: 2023-07-24 | End: 2023-07-28 | Stop reason: HOSPADM

## 2023-07-24 RX ORDER — AMOXICILLIN 250 MG
1 CAPSULE ORAL 2 TIMES DAILY
Status: DISCONTINUED | OUTPATIENT
Start: 2023-07-24 | End: 2023-07-28 | Stop reason: HOSPADM

## 2023-07-24 RX ORDER — DEXAMETHASONE SODIUM PHOSPHATE 4 MG/ML
INJECTION, SOLUTION INTRA-ARTICULAR; INTRALESIONAL; INTRAMUSCULAR; INTRAVENOUS; SOFT TISSUE PRN
Status: DISCONTINUED | OUTPATIENT
Start: 2023-07-24 | End: 2023-07-24

## 2023-07-24 RX ORDER — HYDROMORPHONE HCL IN WATER/PF 6 MG/30 ML
0.2 PATIENT CONTROLLED ANALGESIA SYRINGE INTRAVENOUS EVERY 5 MIN PRN
Status: DISCONTINUED | OUTPATIENT
Start: 2023-07-24 | End: 2023-07-24 | Stop reason: HOSPADM

## 2023-07-24 RX ORDER — CEFAZOLIN SODIUM/WATER 2 G/20 ML
2 SYRINGE (ML) INTRAVENOUS SEE ADMIN INSTRUCTIONS
Status: DISCONTINUED | OUTPATIENT
Start: 2023-07-24 | End: 2023-07-24 | Stop reason: HOSPADM

## 2023-07-24 RX ORDER — POLYETHYLENE GLYCOL 3350 17 G/17G
17 POWDER, FOR SOLUTION ORAL DAILY
Status: DISCONTINUED | OUTPATIENT
Start: 2023-07-25 | End: 2023-07-28 | Stop reason: HOSPADM

## 2023-07-24 RX ORDER — PROCHLORPERAZINE MALEATE 5 MG
5 TABLET ORAL EVERY 6 HOURS PRN
Status: DISCONTINUED | OUTPATIENT
Start: 2023-07-24 | End: 2023-07-28 | Stop reason: HOSPADM

## 2023-07-24 RX ORDER — PROCHLORPERAZINE 25 MG
12.5 SUPPOSITORY, RECTAL RECTAL EVERY 12 HOURS PRN
Status: DISCONTINUED | OUTPATIENT
Start: 2023-07-24 | End: 2023-07-24

## 2023-07-24 RX ORDER — DEXMEDETOMIDINE HYDROCHLORIDE 4 UG/ML
INJECTION, SOLUTION INTRAVENOUS PRN
Status: DISCONTINUED | OUTPATIENT
Start: 2023-07-24 | End: 2023-07-24

## 2023-07-24 RX ADMIN — Medication 20 ML: at 16:02

## 2023-07-24 RX ADMIN — TRANEXAMIC ACID 1 G: 10 INJECTION, SOLUTION INTRAVENOUS at 16:30

## 2023-07-24 RX ADMIN — ACETAMINOPHEN 975 MG: 325 TABLET, FILM COATED ORAL at 21:23

## 2023-07-24 RX ADMIN — HYDROMORPHONE HYDROCHLORIDE 0.4 MG: 0.2 INJECTION, SOLUTION INTRAMUSCULAR; INTRAVENOUS; SUBCUTANEOUS at 11:11

## 2023-07-24 RX ADMIN — FENTANYL CITRATE 50 MCG: 50 INJECTION, SOLUTION INTRAMUSCULAR; INTRAVENOUS at 16:56

## 2023-07-24 RX ADMIN — AMITRIPTYLINE HYDROCHLORIDE 10 MG: 10 TABLET, FILM COATED ORAL at 21:24

## 2023-07-24 RX ADMIN — INSULIN ASPART 2 UNITS: 100 INJECTION, SOLUTION INTRAVENOUS; SUBCUTANEOUS at 23:30

## 2023-07-24 RX ADMIN — TRANEXAMIC ACID 1 G: 10 INJECTION, SOLUTION INTRAVENOUS at 17:16

## 2023-07-24 RX ADMIN — SERTRALINE HYDROCHLORIDE 50 MG: 50 TABLET ORAL at 21:25

## 2023-07-24 RX ADMIN — DEXAMETHASONE SODIUM PHOSPHATE 4 MG: 4 INJECTION, SOLUTION INTRA-ARTICULAR; INTRALESIONAL; INTRAMUSCULAR; INTRAVENOUS; SOFT TISSUE at 16:40

## 2023-07-24 RX ADMIN — HYDROMORPHONE HYDROCHLORIDE 0.4 MG: 0.2 INJECTION, SOLUTION INTRAMUSCULAR; INTRAVENOUS; SUBCUTANEOUS at 08:15

## 2023-07-24 RX ADMIN — PHENYLEPHRINE HYDROCHLORIDE 0.5 MCG/KG/MIN: 10 INJECTION INTRAVENOUS at 16:35

## 2023-07-24 RX ADMIN — HYDROMORPHONE HYDROCHLORIDE 0.5 MG: 1 INJECTION, SOLUTION INTRAMUSCULAR; INTRAVENOUS; SUBCUTANEOUS at 17:08

## 2023-07-24 RX ADMIN — CYCLOBENZAPRINE 10 MG: 10 TABLET, FILM COATED ORAL at 11:18

## 2023-07-24 RX ADMIN — PROPOFOL 25 MCG/KG/MIN: 10 INJECTION, EMULSION INTRAVENOUS at 16:35

## 2023-07-24 RX ADMIN — SODIUM CHLORIDE, POTASSIUM CHLORIDE, SODIUM LACTATE AND CALCIUM CHLORIDE: 600; 310; 30; 20 INJECTION, SOLUTION INTRAVENOUS at 15:42

## 2023-07-24 RX ADMIN — Medication 5 MG: at 16:45

## 2023-07-24 RX ADMIN — FENTANYL CITRATE 50 MCG: 50 INJECTION, SOLUTION INTRAMUSCULAR; INTRAVENOUS at 18:43

## 2023-07-24 RX ADMIN — ATORVASTATIN CALCIUM 40 MG: 40 TABLET, FILM COATED ORAL at 21:24

## 2023-07-24 RX ADMIN — PROPOFOL 120 MG: 10 INJECTION, EMULSION INTRAVENOUS at 16:27

## 2023-07-24 RX ADMIN — DEXMEDETOMIDINE HYDROCHLORIDE 8 MCG: 200 INJECTION INTRAVENOUS at 16:50

## 2023-07-24 RX ADMIN — HYDROMORPHONE HYDROCHLORIDE 0.2 MG: 0.2 INJECTION, SOLUTION INTRAMUSCULAR; INTRAVENOUS; SUBCUTANEOUS at 19:01

## 2023-07-24 RX ADMIN — SODIUM CHLORIDE, POTASSIUM CHLORIDE, SODIUM LACTATE AND CALCIUM CHLORIDE: 600; 310; 30; 20 INJECTION, SOLUTION INTRAVENOUS at 20:21

## 2023-07-24 RX ADMIN — FENTANYL CITRATE 50 MCG: 50 INJECTION, SOLUTION INTRAMUSCULAR; INTRAVENOUS at 16:27

## 2023-07-24 RX ADMIN — MIDAZOLAM 1 MG: 1 INJECTION INTRAMUSCULAR; INTRAVENOUS at 16:12

## 2023-07-24 RX ADMIN — CYCLOBENZAPRINE 10 MG: 10 TABLET, FILM COATED ORAL at 21:24

## 2023-07-24 RX ADMIN — SENNOSIDES AND DOCUSATE SODIUM 1 TABLET: 50; 8.6 TABLET ORAL at 21:24

## 2023-07-24 RX ADMIN — DEXMEDETOMIDINE HYDROCHLORIDE 8 MCG: 200 INJECTION INTRAVENOUS at 17:31

## 2023-07-24 RX ADMIN — SODIUM CHLORIDE, POTASSIUM CHLORIDE, SODIUM LACTATE AND CALCIUM CHLORIDE: 600; 310; 30; 20 INJECTION, SOLUTION INTRAVENOUS at 05:50

## 2023-07-24 RX ADMIN — ACETAMINOPHEN 650 MG: 325 TABLET, FILM COATED ORAL at 11:13

## 2023-07-24 RX ADMIN — Medication 5 MG: at 16:49

## 2023-07-24 RX ADMIN — HYDROMORPHONE HYDROCHLORIDE 0.4 MG: 0.2 INJECTION, SOLUTION INTRAMUSCULAR; INTRAVENOUS; SUBCUTANEOUS at 05:53

## 2023-07-24 RX ADMIN — Medication 2 G: at 16:30

## 2023-07-24 RX ADMIN — OXYCODONE HYDROCHLORIDE 5 MG: 5 TABLET ORAL at 13:22

## 2023-07-24 RX ADMIN — ONDANSETRON 4 MG: 2 INJECTION INTRAMUSCULAR; INTRAVENOUS at 16:40

## 2023-07-24 ASSESSMENT — ACTIVITIES OF DAILY LIVING (ADL)
ADLS_ACUITY_SCORE: 31
ADLS_ACUITY_SCORE: 35
ADLS_ACUITY_SCORE: 31
ADLS_ACUITY_SCORE: 31

## 2023-07-24 ASSESSMENT — ENCOUNTER SYMPTOMS
DYSRHYTHMIAS: 0
SEIZURES: 0

## 2023-07-24 ASSESSMENT — LIFESTYLE VARIABLES: TOBACCO_USE: 1

## 2023-07-24 NOTE — PLAN OF CARE
Goal Outcome Evaluation:    Pt. arrived to the unit around 0500. A&O x4. VSS on RA. Denies cp/sob/n/v. IV dilaudid x1 for pain. PIV infusing. Grace patents. NPO. Bedrest. Possible OR today.

## 2023-07-24 NOTE — PLAN OF CARE
A&OX4.  Patient c/o pain 10/10.  PRN IV dilaudid, tylenol and oxycodone given.  Edwards patent.  Oxygen 88% on room air, currently 97% on 2 liters via nasal cannula.  Blood sugars 129 and 111.  1 unit of Blood started at 1417.  Report given to pre-op nurse, surgery planned for 1600.

## 2023-07-24 NOTE — ANESTHESIA PREPROCEDURE EVALUATION
Anesthesia Pre-Procedure Evaluation    Patient: Jennifer Llanos   MRN: [id number] : 1955        Procedure : Procedure(s):  INTERNAL FIXATION LEFT HIP USING INTRAMEDULLARY NAIL          Past Medical History:   Diagnosis Date     Depression      Diabetes mellitus, type 2 (H)      Hyperlipidemia      Osteoporosis      Tobacco use disorder       Past Surgical History:   Procedure Laterality Date     ABDOMEN SURGERY      bariatric surgery       SECTION       GASTRECTOMY       HYSTERECTOMY       IR MISCELLANEOUS PROCEDURE  2013     IR URETER DILATION BILATERAL  2013      Allergies   Allergen Reactions     Levofloxacin Unknown      Social History     Tobacco Use     Smoking status: Not on file     Smokeless tobacco: Not on file   Substance Use Topics     Alcohol use: Not Currently      Wt Readings from Last 1 Encounters:   17 78.5 kg (173 lb)        Anesthesia Evaluation   Pt has had prior anesthetic. Type: General.    No history of anesthetic complications       ROS/MED HX  ENT/Pulmonary:     (+)                tobacco use, Current use,                   (-) asthma and sleep apnea   Neurologic:    (-) no seizures, no CVA and migraines   Cardiovascular:     (+) Dyslipidemia hypertension- -   -  - -                                   (-) CAD, OSEI, arrhythmias, valvular problems/murmurs and murmur   METS/Exercise Tolerance:     Hematologic:    (-) history of blood clots and anemia   Musculoskeletal: Comment: osteoporosis  (+)  arthritis,   fracture, Fracture location: LLE,         GI/Hepatic:    (-) GERD and liver disease   Renal/Genitourinary:    (-) renal disease and nephrolithiasis   Endo:     (+)  type II DM,                 (-) Type I DM, thyroid disease and obesity   Psychiatric/Substance Use:     (+) psychiatric history depression       Infectious Disease:    (-) Recent Fever   Malignancy:       Other:            Physical Exam    Airway        Mallampati: II   TM distance: > 3 FB    Neck ROM: full   Mouth opening: > 3 cm    Respiratory Devices and Support         Dental       (+) Minor Abnormalities - some fillings, tiny chips      Cardiovascular   cardiovascular exam normal       Rhythm and rate: regular and normal (-) no murmur    Pulmonary           breath sounds clear to auscultation         OUTSIDE LABS:  CBC:   Lab Results   Component Value Date    WBC 8.1 07/24/2023    HGB 7.8 (L) 07/24/2023    HCT 27.0 (L) 07/24/2023     07/24/2023     BMP:   Lab Results   Component Value Date     07/24/2023     03/15/2023    POTASSIUM 4.1 07/24/2023    POTASSIUM 4.9 03/15/2023    CHLORIDE 110 (H) 07/24/2023    CHLORIDE 107 03/15/2023    CO2 22 07/24/2023    CO2 25 03/15/2023    BUN 14.2 07/24/2023    BUN 14.4 03/15/2023    CR 0.48 (L) 07/24/2023    CR 0.66 03/15/2023     (H) 07/24/2023     (H) 07/24/2023     COAGS: No results found for: PTT, INR, FIBR  POC: No results found for: BGM, HCG, HCGS  HEPATIC:   Lab Results   Component Value Date    ALBUMIN 4.3 03/15/2023    PROTTOTAL 7.3 03/15/2023    ALT 17 03/15/2023    AST 28 03/15/2023    ALKPHOS 86 03/15/2023    BILITOTAL 0.3 03/15/2023     OTHER:   Lab Results   Component Value Date    A1C 6.9 (H) 07/24/2023    RHONDA 8.4 (L) 07/24/2023    MAG 1.8 04/29/2020    TSH 1.59 11/05/2020       Anesthesia Plan    ASA Status:  2    NPO Status:  NPO Appropriate    Anesthesia Type: General.     - Airway: LMA   Induction: Propofol.   Maintenance: Balanced.        Consents    Anesthesia Plan(s) and associated risks, benefits, and realistic alternatives discussed. Questions answered and patient/representative(s) expressed understanding.     - Discussed:     - Discussed with:  Patient            Postoperative Care    Pain management: Multi-modal analgesia, Peripheral nerve block (Single Shot).   PONV prophylaxis: Ondansetron (or other 5HT-3), Dexamethasone or Solumedrol, Background Propofol Infusion     Comments:                Janelle  PASCUAL Jackson MD

## 2023-07-24 NOTE — CONSULTS
[facility]    Orthopedic Consultation    Jennifer Llanos MRN# [id number]   Age: 68 year old YOB: 1955     Date of Admission:  2023    Reason for consult: Intertrochanteric fracture       Requesting provider: Dr Ang       Level of consult: Consult, follow and place orders           Assessment and Plan:   Assessment:   Left hip intertrochanteric fracture      Plan:   Patient scheduled for a left hip IM nail later today.   Surgeon: Dr FATMATA Celis  NPO Status effective now   Full femur Xrays ordered   Vit D ordered   NWB/Bedrest until postop  Hold anticoagulants if taken: 81mg ASA  Pain medication as needed, minimize narcotics as able           Chief Complaint:   Intertrochanteric fracture         History of Present Illness:   Jennifer Llanos is a 68 year old female who presented with left hip pain.      History is obtained from the patient and review of medical record. The patient reports she was at her cabin doing a puzzle, she thought that the bench at the table was behind her and attempted to sit down however she missed and landed on her left hip with immediate left hip pain.  She presented to the emergency department in [facility], was found to have a left intertrochanteric fracture and due to lack of bed availability more locally, was transferred to [facility] for orthopedic surgery evaluation.    Patient ambulates independently at baseline without use of cane or walker.  States she has help at home.            Past Medical History:     Past Medical History:   Diagnosis Date    Depression     Diabetes mellitus, type 2 (H)     Hyperlipidemia     Osteoporosis     Tobacco use disorder              Past Surgical History:     Past Surgical History:   Procedure Laterality Date    ABDOMEN SURGERY      bariatric surgery      SECTION      GASTRECTOMY      HYSTERECTOMY      IR MISCELLANEOUS PROCEDURE  2013    IR URETER DILATION BILATERAL   9/24/2013             Social History:     Social History     Tobacco Use    Smoking status: Not on file    Smokeless tobacco: Not on file   Substance Use Topics    Alcohol use: Not Currently             Family History:     Family History   Problem Relation Age of Onset    Cerebrovascular Disease Mother     CABG Father              Immunizations:     VACCINE/DOSE   Diptheria   DPT   DTAP   HBIG   Hepatitis A   Hepatitis B   HIB   Influenza   Measles   Meningococcal   MMR   Mumps   Pneumococcal   Polio   Rubella   Small Pox   TDAP   Varicella   Zoster             Allergies:     Allergies   Allergen Reactions    Levofloxacin Unknown             Medications:     Current Facility-Administered Medications   Medication    acetaminophen (TYLENOL) tablet 650 mg    Or    acetaminophen (TYLENOL) Suppository 650 mg    bisacodyl (DULCOLAX) suppository 10 mg    glucose gel 15-30 g    Or    dextrose 50 % injection 25-50 mL    Or    glucagon injection 1 mg    HYDROmorphone (DILAUDID) injection 0.2 mg    HYDROmorphone (DILAUDID) injection 0.4 mg    insulin aspart (NovoLOG) injection (RAPID ACTING)    lactated ringers infusion    lidocaine (LMX4) cream    lidocaine 1 % 0.1-1 mL    melatonin tablet 1 mg    naloxone (NARCAN) injection 0.2 mg    Or    naloxone (NARCAN) injection 0.4 mg    Or    naloxone (NARCAN) injection 0.2 mg    Or    naloxone (NARCAN) injection 0.4 mg    ondansetron (ZOFRAN ODT) ODT tab 4 mg    Or    ondansetron (ZOFRAN) injection 4 mg    oxyCODONE (ROXICODONE) tablet 5 mg    oxyCODONE IR (ROXICODONE) half-tab 2.5 mg    polyethylene glycol (MIRALAX) Packet 17 g    prochlorperazine (COMPAZINE) injection 5 mg    Or    prochlorperazine (COMPAZINE) tablet 5 mg    Or    prochlorperazine (COMPAZINE) suppository 12.5 mg    sodium chloride (PF) 0.9% PF flush 3 mL    sodium chloride (PF) 0.9% PF flush 3 mL             Review of Systems:   ROS:  10 point ROS neg other than the symptoms noted above in the HPI.             Physical Exam:   All vitals have been reviewed  Patient Vitals for the past 24 hrs:   BP Temp Temp src Pulse Resp SpO2   07/24/23 0754 (P) 108/61 (P) 97.8  F (36.6  C) (P) Oral (P) 64 (P) 16 (P) 95 %   07/24/23 0610 -- -- -- -- 18 --   07/24/23 0505 110/63 97.9  F (36.6  C) Oral 76 20 95 %     No intake or output data in the 24 hours ending 07/24/23 0924      Physical Exam   Temp: (P) 97.8  F (36.6  C) Temp src: (P) Oral BP: (P) 108/61 Pulse: (P) 64   Resp: (P) 16 SpO2: (P) 95 % O2 Device: (P) None (Room air)    Vital Signs with Ranges  Temp:  [97.8  F (36.6  C)-97.9  F (36.6  C)] (P) 97.8  F (36.6  C)  Pulse:  [64-76] (P) 64  Resp:  [16-20] (P) 16  BP: (110)/(63) (P) 108/61  SpO2:  [95 %] (P) 95 %  0 lbs 0 oz    Constitutional: Pleasant, alert, appropriate, following commands.  HEENT: Head atraumatic normocephalic. Pupils equal round and reactive to light.  Respiratory: Unlabored breathing no audible wheeze  Cardiovascular: Regular rate and rhythm per pulses  GI: Abdomen non-distended.  Lymph/Hematologic: No lymphadenopathy in areas examined  Genitourinary:  No strauss  Skin: No rashes, no cyanosis, no edema.  Musculoskeletal: On physical exam of the left lower extremity, patient's leg is resting in a shortened and externally rotated position.  No skin abrasions seen.  Patient is able to dorsi and plantarflex both ankles with equal resistance.  Full sensation to light touch on the left versus right lower extremities.  Distal pulses are intact and equal bilaterally.    Neurologic: normal without focal findings, mental status, speech normal, alert and oriented x iii  Neuropsychiatric: stable            Data:   All laboratory data reviewed  Results for orders placed or performed during the hospital encounter of 07/24/23   Iron and iron binding capacity     Status: Abnormal   Result Value Ref Range    Iron 19 (L) 37 - 145 ug/dL    Iron Binding Capacity 420 240 - 430 ug/dL    Iron Sat Index 5 (L) 15 - 46 %   Glucose by  meter     Status: Abnormal   Result Value Ref Range    GLUCOSE BY METER POCT 129 (H) 70 - 99 mg/dL   Basic metabolic panel     Status: Abnormal   Result Value Ref Range    Sodium 142 136 - 145 mmol/L    Potassium 4.1 3.4 - 5.3 mmol/L    Chloride 110 (H) 98 - 107 mmol/L    Carbon Dioxide (CO2) 22 22 - 29 mmol/L    Anion Gap 10 7 - 15 mmol/L    Urea Nitrogen 14.2 8.0 - 23.0 mg/dL    Creatinine 0.48 (L) 0.51 - 0.95 mg/dL    Calcium 8.4 (L) 8.8 - 10.2 mg/dL    Glucose 115 (H) 70 - 99 mg/dL    GFR Estimate >90 >60 mL/min/1.73m2          Attestation:  I have reviewed today's vital signs, notes, medications, labs and imaging with Dr. FATMATA Celis.  Amount of time performed on this consult: 55 minutes.    Donna Dias PA-C

## 2023-07-24 NOTE — BRIEF OP NOTE
[facility]    Brief Operative Note    Pre-operative diagnosis: Closed fracture of left hip, initial encounter (H) [S72.002A]  Post-operative diagnosis Same as pre-operative diagnosis    Procedure: Procedure(s):  INTERNAL FIXATION LEFT HIP USING INTRAMEDULLARY NAIL  Surgeon: Surgeon(s) and Role:     * Oliver Celis MD - Primary     * Vaishali Davis PA-C - Assisting     * Randy Sotelo - Assisting  Anesthesia: General with Block   Estimated Blood Loss: 100cc    Drains: None  Specimens: * No specimens in log *  Findings:   None.  Complications: None.  Implants:   Implant Name Type Inv. Item Serial No.  Lot No. LRB No. Used Action   IMP NAIL SYN CAN FEM PROX TFNA [id number] 130D 04.037.042S - [id number] [employer]/[place] IMP NAIL SYN CAN FEM PROX TFNA [id number] 130D 04.037.042S  [employer] [id number] Left 1 Implanted   IMP SCR SYN TFNA FENESTRATED LAG 105MM 04.038.205S - [id number] [employer] IMP SCR SYN TFNA FENESTRATED LAG 105MM 04.038.205S  [employer] [id number] Left 1 Implanted   SCREW BN 36MM 5MM LCK X25 IM NL 04.045.036S - [id number] Metallic [employer]ware/[place] SCREW BN 36MM 5MM LCK X25 IM NL 04.045.036S  [employer] [id number] Left 1 Implanted       PLAN:  DVT Prophylaxis: Lovenox IM on POD1. Aspirin 325 mg on discharge.  Dressing: gauze + tegaderm. No changes  PACU Xrays.  Activity: WBAT w walker x6 weeks  PT/OT. Social work for discharge planning.      Please call as soon as possible to make an appointment to be seen in Dr. Oliver eClis's clinic in 2 weeks.     Dr. Celis's care coordinator is Nayeli Camejo. Please contact her at [phone] to schedule an appointment.    Dr. Celis sees patients at 2 clinic locations:  [facility]  [address] [place] [address]  [facility]   [address] , [address], [place] [address]      Please call the on-call phone number [phone]  during evenings, nights and weekends for any urgent needs. Prescription refills must be done during business hours by calling [phone]      Vaishali Davis PA-C  [facility]

## 2023-07-24 NOTE — ANESTHESIA CARE TRANSFER NOTE
Patient: Jennifer Llanos    Procedure: Procedure(s):  INTERNAL FIXATION LEFT HIP USING INTRAMEDULLARY NAIL       Diagnosis: Closed fracture of left hip, initial encounter (H) [S72.002A]  Diagnosis Additional Information: No value filed.    Anesthesia Type:   General     Note:    Oropharynx: oropharynx clear of all foreign objects and spontaneously breathing  Level of Consciousness: awake  Oxygen Supplementation: face mask  Level of Supplemental Oxygen (L/min / FiO2): 6  Independent Airway: airway patency satisfactory and stable  Dentition: dentition unchanged  Vital Signs Stable: post-procedure vital signs reviewed and stable  Report to RN Given: handoff report given  Patient transferred to: PACU    Handoff Report: Identifed the Patient, Identified the Reponsible Provider, Reviewed the pertinent medical history, Discussed the surgical course, Reviewed Intra-OP anesthesia mangement and issues during anesthesia, Set expectations for post-procedure period and Allowed opportunity for questions and acknowledgement of understanding      Vitals:  Vitals Value Taken Time   BP 96/65 07/24/23 1735   Temp     Pulse 82 07/24/23 1738   Resp 20 07/24/23 1738   SpO2 98 % 07/24/23 1738       Electronically Signed By: MICHAEL Cevallos CRNA  July 24, 2023  5:40 PM

## 2023-07-24 NOTE — H&P
[facility]    History and Physical - Hospitalist Service       Date of Admission:  7/24/2023    Assessment & Plan   Jennifer Llanos is a 68 year old female with past medical history including hyperlipidemia, diabetes mellitus type 2, depression who presented to OSH with fall and hip pain, found to have left intertrochanteric fracture and transferred 7/24/2023 for orthopedic surgery evaluation.     Acute left intertrochanteric fracture, mildly displaced  *Presents to [facility] with fall from standing height trying to sit on a bench and missing. Transferred for orthopedic surgery evaluation   *XR with above fracture   *Outside EKG reviewed, normal sinus rhythm   - Orthopedic surgery consulted  - NPO  - Bedrest   - Oxycodone PO PRN, hydromorphone IV PRN     Diabetes mellitus, type 2, diet-controlled   No recent HgbA1c available. Prior to admission reports being on sitagliptin, no insulin.  - HgbA1c  - Medium sliding scale insulin per NPO protocol   - Hold oral medications while hospitalized  - Hypoglycemia protocol    Cardiac murmur  *Benign sounding  *Asymptomatic  *No prior echocardiogram   *Anemia may be contributing as below  - Noted     Depression  - Resume prior to admission sertraline when dose confirmed by pharmacy    Hyperlipidemia  - Resume prior to admission statin when dose confirmed by pharmacy     Microcytic anemia  Outside labs with MCV 71.4, Hgb 8.4 g/dl. No baseline available for comparison.  - Iron studies, ferritin  - Monitor hemoglobin post-op     Tobacco use disorder  - Encouraged cessation  - Declines nicotine replacement       Diet: NPO per Anesthesia Guidelines for Procedure/Surgery Except for: Meds    DVT Prophylaxis: Pneumatic Compression Devices  Edwards Catheter: Not present  Code Status: Full Code      Disposition Plan   Admit to inpatient. Anticipate greater than or equal to 2 midnights prior to discharge.     Entered: Cruz Ang MD 07/24/2023, 6:16 AM      The patient's care was discussed with the patient       Clinically Significant Risk Factors Present on Admission                # Drug Induced Platelet Defect: home medication list includes an antiplatelet medication   # Hypertension: Noted on problem list                    Cruz Ang MD  [facility]    ______________________________________________________________________    Chief Complaint   Fall, left hip pain    History of Present Illness   Jennifer Llanos is a 68 year old female who presents with the above chief complaint.    History is obtained from the patient and review of medical record. The patient reports she was at her cabin doing a puzzle, she thought that the bench at the table was right behind her and attempted to sit down however she missed and landed on her left hip with immediate left hip pain.  She presented to the emergency department in [facility], was found to have a left intertrochanteric fracture and due to lack of bed availability more locally, was transferred to [facility] for orthopedic surgery evaluation.  She reports prior to this which she was in her usual state of health.  She has had previous surgeries without issues with anesthesia.  She takes an aspirin every other day, otherwise no blood thinners.    Past Medical History    I have reviewed this patient's medical history and updated it with pertinent information if needed.   Past Medical History:   Diagnosis Date     Depression      Diabetes mellitus, type 2 (H)      Hyperlipidemia      Osteoporosis      Tobacco use disorder        Past Surgical History   I have reviewed this patient's surgical history and updated it with pertinent information if needed.  Past Surgical History:   Procedure Laterality Date     ABDOMEN SURGERY      bariatric surgery       SECTION       GASTRECTOMY       HYSTERECTOMY       IR MISCELLANEOUS PROCEDURE  2013     IR URETER DILATION BILATERAL   9/24/2013         Social History   Smokes 5-10 cigarettes daily. Denies alcohol use.     Lives in [place]       Family History   I have reviewed this patient's family history and updated it with pertinent information if needed.   Family History   Problem Relation Age of Onset     Cerebrovascular Disease Mother      CABG Father         Prior to Admission Medications  - Pending pharmacy reconciliation   Prior to Admission Medications   Prescriptions Last Dose Informant Patient Reported? Taking?   amitriptyline (ELAVIL) 10 MG tablet   Yes No   Sig: [AMITRIPTYLINE (ELAVIL) 10 MG TABLET] Take 10 mg by mouth bedtime.   aspirin 81 mg chewable tablet   Yes No   Sig: [ASPIRIN 81 MG CHEWABLE TABLET] Chew 81 mg every evening.   atorvastatin (LIPITOR) 40 MG tablet   Yes No   Sig: [ATORVASTATIN (LIPITOR) 40 MG TABLET] Take 40 mg by mouth bedtime.   omeprazole (PRILOSEC) 20 MG capsule   Yes No   Sig: [OMEPRAZOLE (PRILOSEC) 20 MG CAPSULE] TAKE 1 CAPSULE BY MOUTH TWICE DAILY 30 MINUTES TO 1 HOUR BEFORE A MEAL   sertraline (ZOLOFT) 50 MG tablet   Yes No   Sig: [SERTRALINE (ZOLOFT) 50 MG TABLET] Take 50 mg by mouth every evening.    sitaGLIPtin (JANUVIA) 100 MG tablet   Yes No   Sig: [SITAGLIPTIN (JANUVIA) 100 MG TABLET] Take 100 mg by mouth bedtime.      Facility-Administered Medications: None     Allergies   Allergies   Allergen Reactions     Levofloxacin Unknown       Physical Exam   Vital Signs: Temp: 97.9  F (36.6  C) Temp src: Oral BP: 110/63 Pulse: 76   Resp: 18 SpO2: 95 % O2 Device: None (Room air)    Weight: 0 lbs 0 oz    Constitutional: Well-appearing, NAD  Eyes: PERRL, EOMI  HENT: Oropharynx clear, MMM  Respiratory: Clear to auscultation bilaterally, good air movement, normal effort   Cardiovascular: RRR, II/VI systolic murmur heard at LUSB. No peripheral edema.    GI: Soft, non-tender, non-distended. No rebound tenderness or guarding.    Neurologic: Alert. Responding to questions appropriately. Following commands.     Psychiatric: Normal affect, appropriate      Data   Data reviewed today: I reviewed all medications, new labs and imaging results over the last 24 hours. I personally reviewed the EKG tracing showing normal sinus rhythm .    Outside labs:   White blood cell count 9.81, hemoglobin 8.4, platelet count 257    Sodium 140, potassium 4.4, chloride 110, bicarbonate 22, creatinine 0.7, BUN 19, glucose 143, total bilirubin 0.3, AST 23, ALT 13, alkaline phosphatase 84

## 2023-07-24 NOTE — OP NOTE
[facility]  Orthopedic Operative Note    Short Cephallomedullary Nailing    Jennifer Llanos MRN# [id number]   YOB: 1955  Procedure Date:  7/24/2023  Age: 68 year old     PREOPERATIVE DIAGNOSIS:  Intertrochanteric femur fracture left.    POSTOPERATIVE DIAGNOSIS:  Intertrochanteric femur fracture left.     PROCEDURE PERFORMED:  Surgical treatment of left intertrochanteric femur fracture with cephalomedullary device    SURGEON:  MARSHA CONTRERAS MD    FIRST ASSISTANT:   1 Vaishali Davis PA-C; A skilled first assistant was necessary for this procedure for assistance with patient positioning, prepping, draping, surgical visualization, wound closure, and application of the dressing.   2. Randy Sotelo OTC     ANESTHESIA:  General + FI block    EBL: 100cc    COMPLICATIONS: None     DISPOSITION: Post Anesthesia Care Unit    CONDITION: Stable    INDICATIONS:  Jennifer Llanos is a 68 year old female with a history of diabetes, hyperlipidemia, tobacco use who presents with a left intertrochanteric femur fracture after a fall.  She locates pain to her left hip.  She denies any other pain in her left lower extremity.  No prior hip pain.  She lives with her .  She ambulates without any assistive devices.      Radiographs of the left femur reveal an intertrochanteric femur fracture.  Mild degenerative changes of the left.  The lesser trochanter is displaced.     I discussed findings with the patient and her .  We discussed possible treatment options including operative and nonoperative intervention along with the risks and benefits and expected recovery.  I recommended surgery to allow for early function and mobilization.  After thorough discussion, they were in agreement and elected to proceed with surgery.      Risks of surgery discussed included but not limited to bleeding, infection, damage to surrounding neurovascular structures, leg length inequality,  nonunion, malunion, need for revision surgery, blood clots, pulmonary embolus, and the medical risks of anesthesia.      Benefits of surgery discussed included improved chance of union in acceptable alignment, improve function, and reduction in medical risks of hip fractures.  Alternatives discussed included nonoperative management which I did not recommend.      The patient and her  acknowledges risks and wishes to proceed. The informed consent was signed and documented.  I met with the patient preoperatively and marked the operative extremity.      IMPLANTS:  Implant Name Type Inv. Item Serial No.  Lot No. LRB No. Used Action   IMP NAIL SYN CAN FEM PROX TFNA [id number] 130D 04.037.042S - [id number] Metallic Hardware/[employer] IMP NAIL SYN CAN FEM PROX TFNA [id number] 130D 04.037.042S  NextVR [id number] Left 1 Implanted   IMP SCR SYN TFNA FENESTRATED LAG 105MM 04.038.205S - [id number] Metallic Hardware/[place] IMP SCR SYN TFNA FENESTRATED LAG 105MM 04.038.205S  [employer] [id number] Left 1 Implanted   SCREW BN 36MM 5MM LCK X25 IM NL 04.045.036S - [id number] Metallic Hardware/[place] SCREW BN 36MM 5MM LCK X25 IM NL 04.045.036S  [employer] [id number] Left 1 Implanted       PROCEDURE:   The patient was brought into the operating room and placed on operating table.  The patient underwent general anesthesia.  The patient was positioned supine on a Jamila table with the contralateral leg hyperextended.  All bony prominences were well-padded.  The operative extremity was cleansed with Hibiclens. The operative extremity was then prepped with ChloraPrep.  The surgical team scrubbed in.  A WHO timeout was conducted to verify correct patient, correct extremity, presence of the surgeon's initials on the operative extremity, and administration of IV antibiotics, in this case Ancef.     Prior to incision, the operative extremity was provisionally reduced on the fracture table utilizing traction, abdduction and  rotation.     Fluoroscopy was brought in to confirm good start point.  A incision was made just proximal to the greater tuberosity.  The starting guidepin was advanced down to the start point on the greater trochanter.  We confirmed the start point on fluoroscopy and then advanced the guidewire into the femur.  The position of the guidewire was confirmed with fluoroscopy.  The greater trochanter with a reamer. All instruments were removed.  A Synthes 130 degree x 170 cm x 10 mm nail was selected.  This was advanced to an appropriate depth, confirmed by fluoroscopy.       We then advanced the guide for the lag screw down to bone through a small lateral thigh incision.   The guidewire was then advanced into the central position within the femoral neck and head confirmed by fluoroscopy.  We then measured for and selected an appropriate length lag screw, 105 mm.     Prior to reaming an additional derotational guide wire was placed to prevent any rotation of the femoral head/neck with placement of the lag screw    Next the reamer was utilized over the guide wire and then the lag screw was placed by hand.      Prior to placing the set screw, compression at the fracture site was achieved through the guide. This was done under fluoroscopy with approximately 3 mm of compression.     The setscrew was advanced and tightened.  The guide for the interlocking screw was advanced through the jig down to bone through a small lateral thigh incision.  We then drilled for and placed one statically interlocked distal interlocking screw.  Good bicortical purchase was achieved.      All instruments were then removed.  Final fluoroscopic imaging was obtained demonstrating good alignment of the fracture good positioning of all hardware.  We then thoroughly irrigated and closed in layers with 2-0 Monocryl, and 3-0 Monocryl on the skin.  Dermabond was used on the wounds.  The wounds were anesthetized with bupivacaine and sterile dressings  were applied.     All instruments were accounted for at the end of the case. The patient awoke from anesthesia and was transferred to the PACU in stable condition.    PLAN:    Postoperatively:   1.  Ancef x 24 hours.   2.  Lovenox beginning POD 1, may transition to Aspirin enteric coated 325 mg daily upon discharge for DVT prophylaxis.   3.  PACU x-rays.   4.  Weightbearing as tolerated with a walker x 6 weeks.     5.  Physical therapy/occupational therapy.   6.  Keep dressing on for 2 weeks.  OK to shower.  Change dressings if >50% saturation  7.  Osteoporosis workup and treatment with primary care provider within 2 months.   8.  Discharge:  Case management social work consult for placement     FOLLOWUP:     1.  Plan 2-week wound check with x-rays (AP and Lateral Xrays).  This could also be done at patients rehab facility with x-rays sent to me at [facility].   2.  Eight-week followup with X-rays.    Please call as soon as possible to make an appointment to be seen in Dr. Oliver Celis's clinic in 2-3 weeks.     Dr. Celis's care coordinator is Nayeli Camejo. Please contact her at [phone] to schedule an appointment.      Dr. Celis sees patient's at 2 clinic locations:    [facility]  o [address] [place] [address]    [facility]   o [address], [address], [place] [address]      Please call the on-call phone number [phone] during evenings, nights and weekends for any urgent needs. Prescription refills must be done during business hours by calling [phone]        Oliver Celis MD  [facility]

## 2023-07-24 NOTE — ANESTHESIA PROCEDURE NOTES
Airway       Patient location during procedure: OR  Staff -        Anesthesiologist:  Alessandra Reid MD       CRNA: Jonel Ventura APRN CRNA       Performed By: CRNA  Consent for Airway        Urgency: elective  Indications and Patient Condition       Indications for airway management: huang-procedural       Induction type:intravenous       Mask difficulty assessment: 0 - not attempted    Final Airway Details       Final airway type: supraglottic airway    Supraglottic Airway Details        Type: LMA       Brand: I-Gel       LMA size: 4    Post intubation assessment        Placement verified by: capnometry and chest rise        Number of attempts at approach: 1       Number of other approaches attempted: 0       Secured with: pink tape       Ease of procedure: easy       Dentition: Unchanged

## 2023-07-24 NOTE — ANESTHESIA PROCEDURE NOTES
Fascia iliaca Procedure Note    Pre-Procedure   Staff -        Anesthesiologist:  Alessandra Reid MD       Performed By: anesthesiologist       Location: pre-op       Procedure Start/Stop Times: 7/24/2023 4:02 PM and 7/24/2023 4:09 PM       Pre-Anesthestic Checklist: patient identified, IV checked, site marked, risks and benefits discussed, informed consent, monitors and equipment checked, pre-op evaluation, at physician/surgeon's request and post-op pain management  Timeout:       Correct Patient: Yes        Correct Procedure: Yes        Correct Site: Yes        Correct Position: Yes        Correct Laterality: Yes        Site Marked: Yes  Procedure Documentation  Procedure: Fascia iliaca       Diagnosis: HIP FRACTURE       Laterality: left       Patient Position: supine       Patient Prep/Sterile Barriers: mask       Skin prep: Chloraprep       Local skin infiltrated with 3 mL of 1% lidocaine.        Needle Type: short bevel       Needle Gauge: 21.        Needle Length (Inches): 3.13        Ultrasound guided       1. Ultrasound was used to identify targeted nerve, plexus, vascular marker, or fascial plane and place a needle adjacent to it in real-time.       2. Ultrasound was used to visualize the spread of anesthetic in close proximity to the above referenced structure.       3. A permanent image is entered into the patient's record.       4. The visualized anatomic structures appeared normal.       5. There were no apparent abnormal pathologic findings.    Assessment/Narrative         The placement was negative for: blood aspirated, painful injection and site bleeding       Paresthesias: No.       Insertion/Infusion Method: Single Shot       Complications: none    Medication(s) Administered   Bupivacaine 0.5% w/ 1:400K Epi (Injection) - Injection   20 mL - 7/24/2023 4:02:00 PM  Medication Administration Time: 7/24/2023 4:02 PM      FOR [facility] ONLY:   Pain Team Contact information: please page  "the Pain Team [facility]. Search \"Pain\". During daytime hours, please page the attending first. At night please page the resident first.      "

## 2023-07-24 NOTE — PHARMACY-ADMISSION MEDICATION HISTORY
Pharmacist Admission Medication History    Admission medication history is complete. The information provided in this note is only as accurate as the sources available at the time of the update.    Medication reconciliation/reorder completed by provider prior to medication history? No    Information Source(s): Patient and CareEverywhere/SureScripts via in-person    Changes made to PTA medication list:  Added: None  Deleted: None  Changed: aspirin changed to every other day from daily. Patient had bug bites on her arms that weren't healing.     Allergies reviewed with patient and updates made in EHR: yes    Medication History Completed By: Gely Armstrong, PharmD 7/24/2023 9:15 AM    Prior to Admission medications    Medication Sig Last Dose Taking? Auth Provider Long Term End Date   amitriptyline (ELAVIL) 10 MG tablet [AMITRIPTYLINE (ELAVIL) 10 MG TABLET] Take 10 mg by mouth bedtime. Past Week at PM Yes Provider, Historical     aspirin 81 mg chewable tablet Take 81 mg by mouth every other day Past Week at PM Yes Provider, Historical     atorvastatin (LIPITOR) 40 MG tablet [ATORVASTATIN (LIPITOR) 40 MG TABLET] Take 40 mg by mouth bedtime. Past Week at PM Yes Provider, Historical     omeprazole (PRILOSEC) 40 MG DR capsule Take 40 mg by mouth daily as needed prn med Yes Unknown, Entered By History     sertraline (ZOLOFT) 50 MG tablet [SERTRALINE (ZOLOFT) 50 MG TABLET] Take 50 mg by mouth every evening.  Past Week at PM Yes Provider, Historical     sitaGLIPtin (JANUVIA) 100 MG tablet [SITAGLIPTIN (JANUVIA) 100 MG TABLET] Take 100 mg by mouth bedtime. Past Week at PM Yes Provider, Historical

## 2023-07-25 ENCOUNTER — APPOINTMENT (OUTPATIENT)
Dept: PHYSICAL THERAPY | Facility: CLINIC | Age: 68
DRG: 482 | End: 2023-07-25
Payer: MEDICARE

## 2023-07-25 LAB
ERYTHROCYTE [DISTWIDTH] IN BLOOD BY AUTOMATED COUNT: 19.3 % (ref 10–15)
GLUCOSE BLDC GLUCOMTR-MCNC: 118 MG/DL (ref 70–99)
GLUCOSE BLDC GLUCOMTR-MCNC: 126 MG/DL (ref 70–99)
GLUCOSE BLDC GLUCOMTR-MCNC: 138 MG/DL (ref 70–99)
GLUCOSE BLDC GLUCOMTR-MCNC: 175 MG/DL (ref 70–99)
GLUCOSE BLDC GLUCOMTR-MCNC: 193 MG/DL (ref 70–99)
HCT VFR BLD AUTO: 29 % (ref 35–47)
HGB BLD-MCNC: 8.4 G/DL (ref 11.7–15.7)
MCH RBC QN AUTO: 21.2 PG (ref 26.5–33)
MCHC RBC AUTO-ENTMCNC: 29 G/DL (ref 31.5–36.5)
MCV RBC AUTO: 73 FL (ref 78–100)
PLATELET # BLD AUTO: 211 10E3/UL (ref 150–450)
RBC # BLD AUTO: 3.97 10E6/UL (ref 3.8–5.2)
WBC # BLD AUTO: 9.7 10E3/UL (ref 4–11)

## 2023-07-25 PROCEDURE — 250N000011 HC RX IP 250 OP 636: Mod: JZ | Performed by: HOSPITALIST

## 2023-07-25 PROCEDURE — 97116 GAIT TRAINING THERAPY: CPT | Mod: GP

## 2023-07-25 PROCEDURE — 250N000011 HC RX IP 250 OP 636

## 2023-07-25 PROCEDURE — 250N000013 HC RX MED GY IP 250 OP 250 PS 637

## 2023-07-25 PROCEDURE — 85027 COMPLETE CBC AUTOMATED: CPT | Performed by: HOSPITALIST

## 2023-07-25 PROCEDURE — 99233 SBSQ HOSP IP/OBS HIGH 50: CPT | Performed by: HOSPITALIST

## 2023-07-25 PROCEDURE — 97161 PT EVAL LOW COMPLEX 20 MIN: CPT | Mod: GP

## 2023-07-25 PROCEDURE — 250N000013 HC RX MED GY IP 250 OP 250 PS 637: Performed by: PHYSICIAN ASSISTANT

## 2023-07-25 PROCEDURE — 36415 COLL VENOUS BLD VENIPUNCTURE: CPT | Performed by: HOSPITALIST

## 2023-07-25 PROCEDURE — 258N000003 HC RX IP 258 OP 636: Performed by: HOSPITALIST

## 2023-07-25 PROCEDURE — 97530 THERAPEUTIC ACTIVITIES: CPT | Mod: GP

## 2023-07-25 PROCEDURE — 250N000013 HC RX MED GY IP 250 OP 250 PS 637: Performed by: HOSPITALIST

## 2023-07-25 PROCEDURE — 120N000001 HC R&B MED SURG/OB

## 2023-07-25 RX ORDER — VITAMIN B COMPLEX
50 TABLET ORAL DAILY
Qty: 30 TABLET | Refills: 0 | DISCHARGE
Start: 2023-07-26

## 2023-07-25 RX ORDER — MEPERIDINE HYDROCHLORIDE 25 MG/ML
25 INJECTION INTRAMUSCULAR; INTRAVENOUS; SUBCUTANEOUS EVERY 30 MIN PRN
Status: DISCONTINUED | OUTPATIENT
Start: 2023-07-25 | End: 2023-07-26

## 2023-07-25 RX ORDER — DIPHENHYDRAMINE HYDROCHLORIDE 50 MG/ML
50 INJECTION INTRAMUSCULAR; INTRAVENOUS
Status: DISCONTINUED | OUTPATIENT
Start: 2023-07-25 | End: 2023-07-26

## 2023-07-25 RX ORDER — ACETAMINOPHEN 325 MG/1
975 TABLET ORAL EVERY 8 HOURS PRN
Qty: 50 TABLET | Refills: 0 | DISCHARGE
Start: 2023-07-25

## 2023-07-25 RX ORDER — ASPIRIN 325 MG
325 TABLET, DELAYED RELEASE (ENTERIC COATED) ORAL DAILY
Qty: 42 TABLET | Refills: 0 | DISCHARGE
Start: 2023-07-25 | End: 2023-07-27

## 2023-07-25 RX ORDER — METHYLPREDNISOLONE SODIUM SUCCINATE 125 MG/2ML
125 INJECTION, POWDER, LYOPHILIZED, FOR SOLUTION INTRAMUSCULAR; INTRAVENOUS
Status: DISCONTINUED | OUTPATIENT
Start: 2023-07-25 | End: 2023-07-26

## 2023-07-25 RX ORDER — DEXTROSE MONOHYDRATE 25 G/50ML
25-50 INJECTION, SOLUTION INTRAVENOUS
Status: DISCONTINUED | OUTPATIENT
Start: 2023-07-25 | End: 2023-07-25

## 2023-07-25 RX ORDER — AMOXICILLIN 250 MG
1 CAPSULE ORAL 2 TIMES DAILY
Qty: 21 TABLET | Refills: 0 | DISCHARGE
Start: 2023-07-25

## 2023-07-25 RX ORDER — OXYCODONE HYDROCHLORIDE 5 MG/1
5 TABLET ORAL EVERY 4 HOURS PRN
Qty: 25 TABLET | Refills: 0 | Status: SHIPPED | OUTPATIENT
Start: 2023-07-25

## 2023-07-25 RX ORDER — POLYETHYLENE GLYCOL 3350 17 G/17G
17 POWDER, FOR SOLUTION ORAL DAILY
Qty: 510 G | Refills: 0 | DISCHARGE
Start: 2023-07-25

## 2023-07-25 RX ORDER — ALBUTEROL SULFATE 0.83 MG/ML
2.5 SOLUTION RESPIRATORY (INHALATION)
Status: DISCONTINUED | OUTPATIENT
Start: 2023-07-25 | End: 2023-07-26

## 2023-07-25 RX ORDER — NICOTINE POLACRILEX 4 MG
15-30 LOZENGE BUCCAL
Status: DISCONTINUED | OUTPATIENT
Start: 2023-07-25 | End: 2023-07-25

## 2023-07-25 RX ORDER — ALBUTEROL SULFATE 90 UG/1
1-2 AEROSOL, METERED RESPIRATORY (INHALATION)
Status: DISCONTINUED | OUTPATIENT
Start: 2023-07-25 | End: 2023-07-26

## 2023-07-25 RX ADMIN — ACETAMINOPHEN 975 MG: 325 TABLET, FILM COATED ORAL at 21:14

## 2023-07-25 RX ADMIN — CYCLOBENZAPRINE 10 MG: 10 TABLET, FILM COATED ORAL at 16:40

## 2023-07-25 RX ADMIN — SENNOSIDES AND DOCUSATE SODIUM 1 TABLET: 50; 8.6 TABLET ORAL at 08:46

## 2023-07-25 RX ADMIN — METHOCARBAMOL 500 MG: 500 TABLET ORAL at 06:15

## 2023-07-25 RX ADMIN — CEFAZOLIN 1 G: 1 INJECTION, POWDER, FOR SOLUTION INTRAMUSCULAR; INTRAVENOUS at 01:51

## 2023-07-25 RX ADMIN — POLYETHYLENE GLYCOL 3350 17 G: 17 POWDER, FOR SOLUTION ORAL at 08:46

## 2023-07-25 RX ADMIN — CYCLOBENZAPRINE 10 MG: 10 TABLET, FILM COATED ORAL at 21:15

## 2023-07-25 RX ADMIN — ACETAMINOPHEN 975 MG: 325 TABLET, FILM COATED ORAL at 13:43

## 2023-07-25 RX ADMIN — ENOXAPARIN SODIUM 40 MG: 40 INJECTION SUBCUTANEOUS at 10:02

## 2023-07-25 RX ADMIN — INSULIN ASPART 2 UNITS: 100 INJECTION, SOLUTION INTRAVENOUS; SUBCUTANEOUS at 02:06

## 2023-07-25 RX ADMIN — ATORVASTATIN CALCIUM 40 MG: 40 TABLET, FILM COATED ORAL at 21:15

## 2023-07-25 RX ADMIN — AMITRIPTYLINE HYDROCHLORIDE 10 MG: 10 TABLET, FILM COATED ORAL at 21:14

## 2023-07-25 RX ADMIN — OXYCODONE HYDROCHLORIDE 5 MG: 5 TABLET ORAL at 02:05

## 2023-07-25 RX ADMIN — OXYCODONE HYDROCHLORIDE 5 MG: 5 TABLET ORAL at 06:15

## 2023-07-25 RX ADMIN — METHOCARBAMOL 500 MG: 500 TABLET ORAL at 19:42

## 2023-07-25 RX ADMIN — ACETAMINOPHEN 975 MG: 325 TABLET, FILM COATED ORAL at 06:16

## 2023-07-25 RX ADMIN — SERTRALINE HYDROCHLORIDE 50 MG: 50 TABLET ORAL at 21:15

## 2023-07-25 RX ADMIN — CEFAZOLIN 1 G: 1 INJECTION, POWDER, FOR SOLUTION INTRAMUSCULAR; INTRAVENOUS at 08:46

## 2023-07-25 RX ADMIN — OXYCODONE HYDROCHLORIDE 10 MG: 5 TABLET ORAL at 16:40

## 2023-07-25 RX ADMIN — CYCLOBENZAPRINE 10 MG: 10 TABLET, FILM COATED ORAL at 08:46

## 2023-07-25 RX ADMIN — PANTOPRAZOLE SODIUM 40 MG: 40 TABLET, DELAYED RELEASE ORAL at 08:46

## 2023-07-25 RX ADMIN — OXYCODONE HYDROCHLORIDE 10 MG: 5 TABLET ORAL at 10:17

## 2023-07-25 RX ADMIN — IRON SUCROSE 300 MG: 20 INJECTION, SOLUTION INTRAVENOUS at 12:12

## 2023-07-25 RX ADMIN — Medication 50 MCG: at 08:46

## 2023-07-25 RX ADMIN — SENNOSIDES AND DOCUSATE SODIUM 1 TABLET: 50; 8.6 TABLET ORAL at 21:15

## 2023-07-25 ASSESSMENT — ACTIVITIES OF DAILY LIVING (ADL)
ADLS_ACUITY_SCORE: 31
ADLS_ACUITY_SCORE: 37
ADLS_ACUITY_SCORE: 31
ADLS_ACUITY_SCORE: 37
ADLS_ACUITY_SCORE: 31
ADLS_ACUITY_SCORE: 37

## 2023-07-25 NOTE — PLAN OF CARE
Goal Outcome Evaluation:      Patient vital signs are at baseline: Yes  Patient able to ambulate as they were prior to admission or with assist devices provided by therapies during their stay:  Yes  Patient MUST void prior to discharge:  No,  Reason:  Edwards in place  Patient able to tolerate oral intake:  Yes  Pain has adequate pain control using Oral analgesics:  Yes. Pain managed with PRN Oxycodone, PRN Robaxin, scheduled Tylenol, scheduled Flexeril    Does patient have an identified :  Yes  Has goal D/C date and time been discussed with patient:  Yes

## 2023-07-25 NOTE — PROGRESS NOTES
GASTROENTEROLOGY CONSULTATION      Jennifer Llanos  [address] [facility] [address]  68 year old female     Admission Date/Time: 7/24/2023  Primary Care Provider: No Ref-Primary, Physician  Referring / Attending Physician: Dr. Guallpa     We were asked to see the patient in consultation by Dr. Guallpa for evaluation of microcytic anemia.        HPI:  Jennifer Llanos is a 68 year old female with medical history of type 2 diabetes mellitus, hyperlipidemia, cigarette smoking, and history of Maria Luisa-en-Y gastric bypass who is currently hospitalized with a left hip fracture status post internal fixation.  Gastroenterology was consulted given anemia.    Patient was unaware of a low hemoglobin.  She does have some irregular labs checked at her primary care clinic in [place] but thinks these were for her lipids.  She does not have any abdominal pain.  She has not noticed any melena or hematochezia.  She recently started taking omeprazole 40 mg daily.  She does use aspirin 325 mg daily as well.  Patient does not use any oral iron replacement.  She denies any nausea or vomiting.  No fevers and no chills.    Patient does have a history of an anastomotic ulcer at her Maria Luisa-en-Y site.  This was seen on EGD in 2020.  She is up-to-date on her screening colonoscopies and last had a colonoscopy in December 2019.  She does have a history of adenomatous polyps and is due for repeat screening in 2024.        PAST MEDICAL HISTORY:  Patient Active Problem List    Diagnosis Date Noted    Hip fracture (H) 07/24/2023     Priority: Medium    Pain in pelvis 11/05/2014     Priority: Medium    Fall 11/05/2014     Priority: Medium    HTN (hypertension) 11/05/2014     Priority: Medium    DM (diabetes mellitus) (H) 11/05/2014     Priority: Medium    Pelvic fracture (H) 11/05/2014     Priority: Medium          ROS: A comprehensive ten point review of systems was negative aside from those in mentioned in the HPI.       MEDICATIONS:   Prior to  Admission medications    Medication Sig Start Date End Date Taking? Authorizing Provider   acetaminophen (TYLENOL) 325 MG tablet Take 3 tablets (975 mg) by mouth every 8 hours as needed for mild pain 7/25/23  Yes Neva Sargent PA-C   amitriptyline (ELAVIL) 10 MG tablet [AMITRIPTYLINE (ELAVIL) 10 MG TABLET] Take 10 mg by mouth bedtime. 11/5/14  Yes Provider, Historical   aspirin (ASA) 325 MG EC tablet Take 1 tablet (325 mg) by mouth daily for 42 days 7/25/23 9/5/23 Yes Neva Sargent PA-C   atorvastatin (LIPITOR) 40 MG tablet [ATORVASTATIN (LIPITOR) 40 MG TABLET] Take 40 mg by mouth bedtime. 11/5/14  Yes Provider, Historical   omeprazole (PRILOSEC) 40 MG DR capsule Take 40 mg by mouth daily as needed   Yes Unknown, Entered By History   oxyCODONE (ROXICODONE) 5 MG tablet Take 1 tablet (5 mg) by mouth every 4 hours as needed for severe pain 7/25/23  Yes Neva Sargent PA-C   polyethylene glycol (MIRALAX) 17 GM/Dose powder Take 17 g by mouth daily 7/25/23  Yes Neva Sargent PA-C   senna-docusate (SENOKOT-S/PERICOLACE) 8.6-50 MG tablet Take 1 tablet by mouth 2 times daily 7/25/23  Yes Neva Sargent PA-C   sertraline (ZOLOFT) 50 MG tablet [SERTRALINE (ZOLOFT) 50 MG TABLET] Take 50 mg by mouth every evening.  11/5/14  Yes Provider, Historical   sitaGLIPtin (JANUVIA) 100 MG tablet [SITAGLIPTIN (JANUVIA) 100 MG TABLET] Take 100 mg by mouth bedtime. 11/5/14  Yes Provider, Historical   Vitamin D3 (CHOLECALCIFEROL) 25 mcg (1000 units) tablet Take 2 tablets (50 mcg) by mouth daily 7/26/23  Yes Neva Sargent PA-C        ALLERGIES:   Allergies   Allergen Reactions    Levofloxacin Unknown        SOCIAL HISTORY:  Social History     Substance Use Topics    Alcohol use: Not Currently        FAMILY HISTORY:  Family History   Problem Relation Age of Onset    Cerebrovascular Disease Mother     CABG Father         PHYSICAL EXAM:     /70 (BP Location: Right arm, Patient Position:  "Semi-Toure's, Cuff Size: Adult Regular)   Pulse 86   Temp 97.8  F (36.6  C) (Oral)   Resp 16   Ht 1.626 m (5' 4\")   Wt 78.2 kg (172 lb 6.4 oz)   SpO2 (!) 88%   BMI 29.59 kg/m       PHYSICAL EXAM:  GENERAL: No distress resting comfortably  SKIN: no suspicious lesions, rashes, jaundice  HEAD: Normocephalic. Atraumatic.  NECK: Neck supple. No adenopathy.   EYES: No scleral icterus  GASTROINTESTINAL: +BS, soft, non tender, non distended, no guarding/rebound  JOINT/EXTREMITIES:  no gross deformities noted, normal muscle tone  NEURO: CN 2-12 grossly intact, no focal deficits  PSYCH: Normal affect           ADDITIONAL COMMENTS:   I reviewed the patient's new clinical lab test results.     Recent Labs   Lab 07/25/23  0841 07/24/23  0836   WBC 9.7 8.1   RBC 3.97 3.70*   HGB 8.4* 7.8*   HCT 29.0* 27.0*   MCV 73* 73*   MCH 21.2* 21.1*   MCHC 29.0* 28.9*   RDW 19.3* 19.7*    225     Recent Labs   Lab Test 07/24/23  0836 03/15/23  1159 07/25/22  1544   POTASSIUM 4.1 4.9 4.9   CHLORIDE 110* 107 106   CO2 22 25 22   BUN 14.2 14.4 11.6   ANIONGAP 10 11 13     Recent Labs   Lab Test 03/15/23  1159 07/25/22  1544 11/05/20  1646   ALBUMIN 4.3 4.2 4.1   BILITOTAL 0.3 0.3 0.5   ALT 17 16 18   AST 28 32 23        IMAGING / ENDOSCOPY       Recent Results (from the past 24 hour(s))   XR Surgery KELLEY L/T 5 Min Fluoro w Stills    Narrative    EXAM: XR SURGERY KELLEY FLUORO LESS THAN 5 MIN W STILLS  LOCATION: [facility]  DATE: 7/24/2023    INDICATION: left hip open reduction  COMPARISON: 07/23/2023    TECHNIQUE: Intraoperative fluoroscopy performed during the patient's procedure.    FLUOROSCOPIC TIME: 0.6  NUMBER OF IMAGES: 4    FINDINGS: [place] extends across the left femoral neck with short intramedullary federico proximal femur. This extends across a left femoral neck fracture, with a slightly displaced lesser trochanter fragment. The main fracture components are in anatomic   alignment. No evidence for " complications.   XR Pelvis w Hip Port Left  1 View    Narrative    EXAM: XR PELVIS AND HIP PORTABLE LEFT 1 VIEW  LOCATION: [facility]  DATE: 7/24/2023    INDICATION: Status post hip surgery.  COMPARISON: None.      Impression    IMPRESSION: Postoperative changes related to recent reduction and nailing for management of left proximal femur fracture. Mildly displaced left lesser trochanteric avulsive fracture. Gamma nail projects in the expected position. Expected soft tissue gas   and swelling lateral left hip. Chronic healed right superior and inferior pubic rami fracture deformities. Mild bilateral hip osteoarthritis. Surgical clips are seen overlying the pelvis. Degenerative change lumbosacral spine. Bladder catheter.         CONSULTATION ASSESSMENT AND PLAN:    Jennifer Llanos is a 68 year old with medical history of type 2 diabetes mellitus, hyperlipidemia, cigarette smoking, and history of Maria Luisa-en-Y gastric bypass who is currently hospitalized with a left hip fracture status post internal fixation.  Gastroenterology was consulted given anemia.    Microcytic anemia: There is no evidence of overt GI bleeding.  The patient's altered anatomy due to gastric bypass is contributing to malabsorption of iron.  Her vitamin D is also significantly low but she is on replacement for this.  She is up-to-date on her screening colonoscopies.  As no overt bleeding, would suggest outpatient EGD once recovered from a hip standpoint.    -- PPI 40 mg daily  -- She did receive a dose of IV iron, may benefit from oral iron supplementation  -- Serial hemoglobin.  Transfusions as needed per medicine team  -- If patient develops melena or hematochezia or worsening HgB, would consider inpatient EGD.      I discussed the patient plan with Dr. West, GI staff physician. Thank you for asking us to participate in the care of this patient.     40 min of total time was spent providing patient care, including  "patient evaluation, reviewing documentation/ test results, and .     Shari Mendez PA-C  [facility] ( [facility])                 Addendum to Gastroenterology Service Note    I have personally seen and examined the patient.  Discussed with SHARA Bro and agree with the outlined assessment and recommendations.    Brief HPI   A 67 yo CF with history of gastric bypass surgery with german-en-y anatomy along with diabetes mellitus, hyperlipidemia, tobacco abuse is seen in GI consultation for evaluation of iron deficiency anemia at the request of Melia Mejia MD.  Patient with witnessed mechanical fall at home resulting in left hip fracture that required surgical intervention with internal fixation on 07/22.  On admission, patient was noted to have microcytic anemia with iron panel consistent with iron deficiency.  Patient denies any prior history of iron deficiency anemia and not on any iron supplements.  She is asymptomatic from GI standpoint and denies any overt GI bleeding.  No episodes of abdominal pain, nausea, emesis, worsening heartburn, dysphagia, odynophagia, or changes in bowel habit.  Prior to admission, bowel movement frequency about twice a day of normal brown color stools.  No bowel movements since admission.  Last EGD in 2020 notable for non-bleeding ulcer at anastomotic site.  Last colonoscopy in 12/2019 with tubular adenomatous polyp with recommendations for a repeat colonoscopy in 2024.  Denies any NSAID use.  No known family history of pertinent GI pathology.     Objective   Vitals /65 (BP Location: Right arm)   Pulse 83   Temp 98  F (36.7  C) (Oral)   Resp 16   Ht 1.626 m (5' 4\")   Wt 78.2 kg (172 lb 6.4 oz)   SpO2 93%   BMI 29.59 kg/m          General:   Well-developed elderly CF in NAD.   HEENT:   NC/AT. MMM.   Lungs:  No respiratory distress.   Heart:  RRR. +S1, S2.    Abdomen:   Soft. NT/ND. BS active.    Neuro:   A&O x3. No focal deficits noted.  "   Psych:  Appears to have normal affect and mood.   Skin:  No obvious rashes or lesions noted.         Laboratory and Imaging   I have reviewed the current diagnostic and laboratory tests.     Assessment / Recommendations:     Assessment:  Iron deficiency anemia of unclear etiology.  No overt GI bleeding or associated GI symptoms.  Chart review suggests prior blood work in 2017 also consistent with iron deficiency and mild anemia at Hgb around 11 g/dL - though current hemoglobin on admission lower at 7.8 g/dL.  Patient unaware of prior diagnosis of iron deficiency and not on any supplements prior to admission.  Suspect decreased iron absorption in setting of prior german-en-y anatomy along with possible slow pace GI blood loss from anastomotic site ulceration, angiectasia, Ignacio lesions, peptic ulcer disease, or less likely neoplastic process.  Clinical presentation not suggestive of but cannot exclude inflammatory bowel disease.  Prior EGD 2020 and colonoscopy 2019 reviewed.  Discussed possible repeat endoscopic evaluation with patient with preference to avoid any endoscopic procedure at this time unless clinically necessary.  With clinical stability, agree with deferring to outpatient procedures once acute orthopaedic rehab completed.  History of gastric surgery with german-en-y anatomy.    Recommendations:  No plan for urgent inpatient endoscopic procedures in setting of clinical stability and patient preference.  Outpatient EGD and colonoscopy with timing to be determined following acute orthopaedic rehab.  Would consider more urgent EGD and/or colonoscopy evaluation should patient develop any signs of overt GI bleeding or acute drop in hemoglobin.  Agree with IV iron infusion while inpatient with transition to oral formulation on discharge.  Monitor hemoglobin and transfuse as needed.  Continue Pantoprazole once daily.       Total time spent in chart review, direct medical discussion, examination, and  documentation was 30 minutes.    Rodney West MD  [facility]  [phone]  I appreciate the opportunity to participate in the care of this patient.   Please feel free to call me with any questions or concerns.

## 2023-07-25 NOTE — PROGRESS NOTES
Ortho Daily Progress Note  Jennifer is doing well postoperatively.  She reports her quad is still weak from the block.  She also has a numbness over her anterior thigh.  She is neuro intact distally.  No fevers or chills.  Pain is well controlled    Temp:  [97.8  F (36.6  C)-99.8  F (37.7  C)] 98.2  F (36.8  C)  Pulse:  [75-90] 80  Resp:  [11-22] 16  BP: ()/(53-70) 112/61  SpO2:  [88 %-100 %] 91 %  Hemoglobin   Date Value Ref Range Status   07/25/2023 8.4 (L) 11.7 - 15.7 g/dL Final   ]    Alert, appropriate, and following commands  Breathing easily without wheeze    LLE:  Dressing intact  Mild pain with gentle motion of the hip  Sensations intact to light touch in the superficial peroneal, deep peroneal, tibial nerve distributions  FHL, EHL, dorsiflexion, plantarflexion intact  Dorsalis pedis pulse 2+, good capillary refill      A/P - 68 year old female who is postoperative day 1 status post a left hip ORIF with intramedullary nail for a intertrochanteric femur fracture.  She is doing well postoperatively.  Her block is still in effect.  Physical therapy has been limited due to block.  We discussed discharge instructions as well as follow-up information was provided.  All questions were    1.  Ancef complete.   2.  Lovenox beginning POD 1, may transition to Aspirin enteric coated 325 mg daily upon discharge for DVT prophylaxis.   3.  PACU x-rays - reviewed  4.  Weightbearing as tolerated with a walker x 6 weeks.     5.  Physical therapy/occupational therapy.   6.  Keep dressing on for 2 weeks.  OK to shower.  Change dressings if >50% saturation  7.  Osteoporosis workup and treatment with primary care provider within 2 months.   8.  Discharge:  Case management social work consult for placement       Oliver Celis MD

## 2023-07-25 NOTE — PLAN OF CARE
Goal Outcome Evaluation:    Pt A&OX4, VSS ON RA. CMS intact except numbness LLE.  ASX1 with GB and walker up to chair. Pain managed with scheduled Tylenol and PRN Oxycodone. Dressing CDI. Discharge pending.

## 2023-07-25 NOTE — PROGRESS NOTES
Orthopedic Surgery  Jennifer Llanos  07/25/2023     Admit Date:  7/24/2023    POD: 1 Day Post-Op   Procedure(s):  INTERNAL FIXATION LEFT HIP USING INTRAMEDULLARY NAIL     Patient resting comfortably in bed.    Pain controlled.  Does note some left LE numbness.    Tolerating oral intake.    Denies nausea or vomiting  Denies chest pain or shortness of breath    Temp:  [97.8  F (36.6  C)-99.8  F (37.7  C)] 97.8  F (36.6  C)  Pulse:  [63-90] 86  Resp:  [11-22] 16  BP: ()/(53-70) 124/70  SpO2:  [88 %-100 %] 88 %    Alert and oriented  Dressing is clean, dry, and intact.   Minimal erythema of the surrounding skin.   Bilateral calves are soft, non-tender.  Sensation decreased along mid left thigh/low leg region.  Sensation intact along left thigh.    Patient able to resist dorsi and plantar flexion bilaterally  +Dp pulse    Labs:  Recent Labs   Lab Test 07/25/23  0841 07/24/23  0836   WBC 9.7 8.1   HGB 8.4* 7.8*    225       1. PLAN:   Continue Lovenox while inpatient, ASA at discharge for DVT prophylaxis.     Mobilize with PT/OT    WBAT Left LE with walker.     Continue current pain regiment.   Dressings: Keep intact.  Change if >50% saturated or peeling off.    Follow-up: 2 weeks post-op with Dr FATMATA Celis team    2. Disposition   Anticipate d/c to TCU vs home 2 days.  Patient hoping to discharge to home on Thursday if PT progression.      Donna Dias PA-C

## 2023-07-25 NOTE — PROGRESS NOTES
"   07/25/23 1025   Appointment Info   Signing Clinician's Name / Credentials (PT) Juan Manley, PT, DPT   Rehab Comments (PT) WBAT, no hip precautions LLE       Present no   Living Environment   People in Home spouse   Current Living Arrangements house   Home Accessibility stairs to enter home;stairs within home   Number of Stairs, Main Entrance 1   Stair Railings, Main Entrance none   Number of Stairs, Within Home, Primary greater than 10 stairs  (14)   Stair Railings, Within Home, Primary railing on left side (ascending)   Transportation Anticipated car, drives self;family or friend will provide   Living Environment Comments Pt lives in a house with her spouse. 1 [address] and 14 stairs up to second level where bedrooms are.   Self-Care   Usual Activity Tolerance moderate   Current Activity Tolerance poor   Equipment Currently Used at Home none   Fall history within last six months yes   Number of times patient has fallen within last six months 1   Activity/Exercise/Self-Care Comment Pt reporting IND with mobility and ADLs at baseline. Does not own any assistive devices.   General Information   Onset of Illness/Injury or Date of Surgery 07/24/23   Referring Physician Vaishali Davis PA-C   Patient/Family Therapy Goals Statement (PT) return home   Pertinent History of Current Problem (include personal factors and/or comorbidities that impact the POC) Per chart review, \"Jennifer Llanos is a 68 year old female with a history of diabetes, hyperlipidemia, tobacco use who presents with a left intertrochanteric femur fracture after a fall.  She locates pain to her left hip.  She denies any other pain in her left lower extremity.  No prior hip pain.  She lives with her .  She ambulates without any assistive devices. Now POD #1 INTERNAL FIXATION LEFT HIP USING INTRAMEDULLARY NAIL.\"   Existing Precautions/Restrictions fall;weight bearing   Weight-Bearing Status - LLE weight-bearing as tolerated "   General Observations WBAT LLE, no hip movement restrictions   Cognition   Affect/Mental Status (Cognition) WFL   Orientation Status (Cognition) oriented x 4   Integumentary/Edema   Integumentary/Edema no deficits were identifed   Integumentary/Edema Comments L hip incision CDI   Posture    Posture Forward head position;Protracted shoulders   Range of Motion (ROM)   Range of Motion ROM deficits secondary to pain;ROM deficits secondary to weakness   ROM Comment LLE AROM grossly limited 2/2 weakness   Strength (Manual Muscle Testing)   Strength (Manual Muscle Testing) Deficits observed during functional mobility   Strength Comments moderate funtional strength deficits   Bed Mobility   Comment, (Bed Mobility) min A for assist with moving LLE to edge of bed   Transfers   Comment, (Transfers) sit>stand w/ FWW and min A   Gait/Stairs (Locomotion)   Distance in Feet 5' eval   Distance in Feet (Gait) 5'   Comment, (Gait/Stairs) Pt ambulated 5' w/ FWW and mod A. Demonstrating poor weight bearing through LLE in standing with buckling noted.   Balance   Balance Comments impaired dynamic balance 2/2 needing FWW for mobility   Sensory Examination   Sensory Perception other (describe)   Sensory Perception Comments pt reporting continued numbness from L knee and distal, improved sensation on proximal L thigh   Clinical Impression   Criteria for Skilled Therapeutic Intervention Yes, treatment indicated   PT Diagnosis (PT) impaired functional mobility   Influenced by the following impairments impaired strength, balance, activity tolerance, high post-op pain levels   Functional limitations due to impairments limited IND with mobility   Clinical Presentation (PT Evaluation Complexity) Stable/Uncomplicated   Clinical Presentation Rationale clinical judgement   Clinical Decision Making (Complexity) low complexity   Planned Therapy Interventions (PT) balance training;bed mobility training;home exercise program;gait  training;patient/family education;stair training;strengthening;transfer training;progressive activity/exercise;home program guidelines   Anticipated Equipment Needs at Discharge (PT) walker, rolling   Risk & Benefits of therapy have been explained evaluation/treatment results reviewed;care plan/treatment goals reviewed;risks/benefits reviewed;current/potential barriers reviewed;participants voiced agreement with care plan;participants included;patient   PT Total Evaluation Time   PT Eval, Low Complexity Minutes (44567) 7   Plan of Care Review   Plan of Care Reviewed With patient   Physical Therapy Goals   PT Frequency 5x/week   PT Predicted Duration/Target Date for Goal Attainment 08/01/23   PT Goals Bed Mobility;Transfers;Gait;Stairs   PT: Bed Mobility Supervision/stand-by assist;Supine to/from sit   PT: Transfers Supervision/stand-by assist;Sit to/from stand;Bed to/from chair;Assistive device   PT: Gait Supervision/stand-by assist;Rolling walker;150 feet   PT: Stairs Minimal assist;Greater than 10 stairs;Rail on left   Interventions   Interventions Quick Adds Therapeutic Activity;Gait Training   Therapeutic Activity   Therapeutic Activities: dynamic activities to improve functional performance Minutes (77139) 15   Symptoms Noted During/After Treatment Fatigue;Increased pain   Treatment Detail/Skilled Intervention Greeted pt upon arrival to room. Pt agreeable to working with PT. Time spent providing education regarding WBAT status and no hip movement restrictions of L hip with mobility. Pt demonstrating good understanding of education. Supine>sit w/ min A for management and assist of LLE to edge of bed. Sit>stand w/ min A and FWW. Cueing for safe and efficient sit<>stand procedure including scooting to the front edge of the chair, to lean forward with nose over toes, and hand and foot placement. Following ambulation, stand>sit w/ FWW and min A. Sit>supine w/ mod A for assist with lower extremities. Pt left with all  needs met and call light within reach. Rn updated.   Gait Training   Gait Training Minutes (49528) 8   Symptoms Noted During/After Treatment (Gait Training) increased pain   Treatment Detail/Skilled Intervention Pt ambulated 5' w/ FWW and mod A. Demonstrating very poor weight bearing through LLE in standing, with buckling noted throughout. Cueing for increased use of upper extremities to support while ambulating with mild improvement. Pt noting very high pain levels with ambulation and unable to walk further than 5 feet.   PT Discharge Planning   PT Plan PT: ambulate w/ close chair follow, transfers, stairs as able   PT Discharge Recommendation (DC Rec) Transitional Care Facility   PT Rationale for DC Rec Pt is well below baseline mobility level. Pt currently requiring assist with all functional mobility. Pt presents with deficits in activity tolerance, balance, and strength. Very limited by pain levels of L hip, only ambulating a few feet at this time with heavy assist. Due to these deficits, pt is a high falls risk and unsafe to return home at this time. Pt would benefit from continued skilled PT services via TCU to address deficits and improve IND with safety and functional mobility in order to return to [facility].   PT Brief overview of current status A x1 w/ FWW transfers   Total Session Time   Timed Code Treatment Minutes 23   Total Session Time (sum of timed and untimed services) 30

## 2023-07-25 NOTE — PROVIDER NOTIFICATION
Notified provider about indwelling strauss catheter discussed removal or continued need.    Did provider choose to remove indwelling strauss catheter? NO    Provider's strauss indication for keeping indwelling strauss catheter: Indication for continued use: Retention    Is there an order for indwelling strauss catheter? YES    *If there is a plan to keep strauss catheter in place at discharge daily notification with provider is not necessary, but please add a notation in the treatment team sticky note that the patient will be discharging with the catheter.

## 2023-07-25 NOTE — ANESTHESIA POSTPROCEDURE EVALUATION
Patient: Jennifer Llanos    Procedure: Procedure(s):  INTERNAL FIXATION LEFT HIP USING INTRAMEDULLARY NAIL       Anesthesia Type:  General    Note:  Disposition: Admission   Postop Pain Control: Uneventful            Sign Out: Well controlled pain   PONV: No   Neuro/Psych: Uneventful            Sign Out: Acceptable/Baseline neuro status   Airway/Respiratory: Uneventful            Sign Out: Acceptable/Baseline resp. status   CV/Hemodynamics: Uneventful            Sign Out: Acceptable CV status   Other NRE:    DID A NON-ROUTINE EVENT OCCUR? No           Last vitals:  Vitals Value Taken Time   /67 07/24/23 1930   Temp 37.1  C (98.8  F) 07/24/23 1901   Pulse 90 07/24/23 1930   Resp 13 07/24/23 1930   SpO2 95 % 07/24/23 1930       Electronically Signed By: Donna Rivera  July 24, 2023  8:06 PM

## 2023-07-25 NOTE — PROGRESS NOTES
[facility]    Hospitalist Progress Note    Interval History   Patient awake and alert.  S/p intramedullary nail fixation.  Tolerating pain well.    -Data reviewed today: I reviewed all new labs and imaging results over the last 24 hours. I personally reviewed no images or EKG's today.    Physical Exam   Temp: 97.8  F (36.6  C) Temp src: Oral BP: 124/70 Pulse: 86   Resp: 16 SpO2: (!) 88 % O2 Device: None (Room air) Oxygen Delivery: 2 LPM  Vitals:    07/24/23 2125   Weight: 78.2 kg (172 lb 6.4 oz)     Vital Signs with Ranges  Temp:  [97.8  F (36.6  C)-99.8  F (37.7  C)] 97.8  F (36.6  C)  Pulse:  [63-90] 86  Resp:  [11-22] 16  BP: ()/(53-70) 124/70  SpO2:  [88 %-100 %] 88 %  I/O last 3 completed shifts:  In: 820 [P.O.:120; I.V.:400]  Out: 1450 [Urine:1350; Blood:100]    Physical Exam  Constitutional:       Appearance: Normal appearance.   HENT:      Head: Normocephalic.   Eyes:      Pupils: Pupils are equal, round, and reactive to light.   Cardiovascular:      Rate and Rhythm: Normal rate and regular rhythm.      Pulses: Normal pulses.      Heart sounds: Normal heart sounds.   Pulmonary:      Effort: Pulmonary effort is normal. No respiratory distress.      Breath sounds: Normal breath sounds.   Abdominal:      General: Abdomen is flat. Bowel sounds are normal. There is no distension.      Tenderness: There is no abdominal tenderness. There is no guarding.   Musculoskeletal:         General: Normal range of motion.      Cervical back: Normal range of motion.      Comments: Left surgical hip site appears stable   Skin:     General: Skin is warm and dry.      Coloration: Skin is pale.   Neurological:      General: No focal deficit present.   Psychiatric:         Mood and Affect: Mood normal.           Medications      acetaminophen  975 mg Oral Q8H    amitriptyline  10 mg Oral At Bedtime    aspirin  81 mg Oral Every Other Day    atorvastatin  40 mg Oral At Bedtime    cyclobenzaprine  10  mg Oral TID    enoxaparin ANTICOAGULANT  40 mg Subcutaneous Q24H    insulin aspart  1-7 Units Subcutaneous TID AC    insulin aspart  1-5 Units Subcutaneous At Bedtime    pantoprazole  40 mg Oral QAM    polyethylene glycol  17 g Oral Daily    senna-docusate  1 tablet Oral BID    sertraline  50 mg Oral QPM    sodium chloride (PF)  3 mL Intracatheter Q8H    cholecalciferol  50 mcg Oral Daily       Data   Recent Labs   Lab 07/25/23  1151 07/25/23  0841 07/25/23  0603 07/25/23  0146 07/24/23  1040 07/24/23  0836   WBC  --  9.7  --   --   --  8.1   HGB  --  8.4*  --   --   --  7.8*   MCV  --  73*  --   --   --  73*   PLT  --  211  --   --   --  225   NA  --   --   --   --   --  142   POTASSIUM  --   --   --   --   --  4.1   CHLORIDE  --   --   --   --   --  110*   CO2  --   --   --   --   --  22   BUN  --   --   --   --   --  14.2   CR  --   --   --   --   --  0.48*   ANIONGAP  --   --   --   --   --  10   RHONDA  --   --   --   --   --  8.4*   *  --  138* 193*   < > 115*    < > = values in this interval not displayed.       Recent Results (from the past 24 hour(s))   XR Surgery KELLEY L/T 5 Min Fluoro w Stills    Narrative    EXAM: XR SURGERY KELLEY FLUORO LESS THAN 5 MIN W STILLS  LOCATION: [facility]  DATE: 7/24/2023    INDICATION: left hip open reduction  COMPARISON: 07/23/2023    TECHNIQUE: Intraoperative fluoroscopy performed during the patient's procedure.    FLUOROSCOPIC TIME: 0.6  NUMBER OF IMAGES: 4    FINDINGS: [place] extends across the left femoral neck with short intramedullary federico proximal femur. This extends across a left femoral neck fracture, with a slightly displaced lesser trochanter fragment. The main fracture components are in anatomic   alignment. No evidence for complications.   XR Pelvis w Hip Port Left  1 View    Narrative    EXAM: XR PELVIS AND HIP PORTABLE LEFT 1 VIEW  LOCATION: [facility]  DATE: 7/24/2023    INDICATION: Status post hip  surgery.  COMPARISON: None.      Impression    IMPRESSION: Postoperative changes related to recent reduction and nailing for management of left proximal femur fracture. Mildly displaced left lesser trochanteric avulsive fracture. Gamma nail projects in the expected position. Expected soft tissue gas   and swelling lateral left hip. Chronic healed right superior and inferior pubic rami fracture deformities. Mild bilateral hip osteoarthritis. Surgical clips are seen overlying the pelvis. Degenerative change lumbosacral spine. Bladder catheter.         Assessment & Plan      Assessment & Plan  Jennifer Llanos is a 68 year old female with past medical history including hyperlipidemia, diabetes mellitus type 2, depression who presented to OSH with fall and hip pain, found to have left intertrochanteric fracture and transferred 7/24/2023 for orthopedic surgery evaluation.      Acute left intertrochanteric fracture, mildly displaced  *Presents to [place] ED with fall from standing height trying to sit on a bench and missing. Transferred for orthopedic surgery evaluation   *XR with above fracture   *Outside EKG reviewed, normal sinus rhythm   - Orthopedic surgery consulted.   -Underwent internal fixation of the left hip with intramedullary nail on 7/24/2023  -Postop pain better controlled.  -PT OT consulted.  - Oxycodone PO PRN, hydromorphone IV PRN   -Would recommend minimizing NSAID use until GI work-up in the setting of severe unexplained iron deficiency anemia.  -Continue on Lovenox for DVT prophylaxis.  Plan on high-dose aspirin upon discharge.     Diabetes mellitus, type 2, diet-controlled   No recent HgbA1c available. Prior to admission reports being on sitagliptin, no insulin.  - HgbA1c  - Medium sliding scale insulin per NPO protocol   - Hold oral medications while hospitalized  - Hypoglycemia protocol     Cardiac murmur  *Benign sounding  *Asymptomatic  *No prior echocardiogram   *Anemia may be contributing as  "below  - Noted .  Consider obtaining echocardiogram as an outpatient     Depression  -On amitriptyline and sertraline     Hyperlipidemia  -On atorvastatin    Microcytic anemia  Outside labs with MCV 71.4, Hgb 8.4 g/dl. No baseline available for comparison.  - Iron studies, ferritin show severe iron deficiency anemia.  -Hemoglobin here prior to surgery on 7/24/2023 was 7.8.  Ordered 1 unit packed RBCs in anticipation for blood loss with surgery.  Hemoglobin postop after 1 unit of packed RBCs is 8.4.  -Patient has unexplained severe iron deficiency with microcytic anemia.  Will order IV iron 300 mg every 48 hours for 3 doses while she is here.  -Will need GI work-up to evaluate for GI blood loss with EGD and colonoscopy.  She was seen by [facility] in the past.    - on pantoprazole 40 daily.  -Stool Hemoccult ordered.  [facility] consulted.     Tobacco use disorder  - Encouraged cessation  - Declines nicotine replacement       Diet: regular diet   DVT Prophylaxis: Pneumatic Compression Devices  Edwards Catheter: Not present  Code Status: Full Code    Clinically Significant Risk Factors                  # Hypertension: Noted on problem list       # DMII: A1C = 6.9 % (Ref range: <5.7 %) within past 6 months, PRESENT ON ADMISSION  # Overweight: Estimated body mass index is 29.59 kg/m  as calculated from the following:    Height as of this encounter: 1.626 m (5' 4\").    Weight as of this encounter: 78.2 kg (172 lb 6.4 oz)., PRESENT ON ADMISSION           DVT Prophylaxis: aspirin  Code Status: Full Code  Disposition: Expected discharge to TCU versus home in 2 days      Melia Mejia MD, MD  [phone](p)   "

## 2023-07-26 ENCOUNTER — APPOINTMENT (OUTPATIENT)
Dept: OCCUPATIONAL THERAPY | Facility: CLINIC | Age: 68
DRG: 482 | End: 2023-07-26
Payer: MEDICARE

## 2023-07-26 ENCOUNTER — APPOINTMENT (OUTPATIENT)
Dept: PHYSICAL THERAPY | Facility: CLINIC | Age: 68
DRG: 482 | End: 2023-07-26
Attending: INTERNAL MEDICINE
Payer: MEDICARE

## 2023-07-26 LAB
GLUCOSE BLDC GLUCOMTR-MCNC: 104 MG/DL (ref 70–99)
GLUCOSE BLDC GLUCOMTR-MCNC: 123 MG/DL (ref 70–99)
GLUCOSE BLDC GLUCOMTR-MCNC: 137 MG/DL (ref 70–99)
GLUCOSE BLDC GLUCOMTR-MCNC: 162 MG/DL (ref 70–99)
GLUCOSE BLDC GLUCOMTR-MCNC: 204 MG/DL (ref 70–99)
GLUCOSE SERPL-MCNC: 160 MG/DL (ref 70–99)
HGB BLD-MCNC: 7.8 G/DL (ref 11.7–15.7)
POTASSIUM SERPL-SCNC: 3.7 MMOL/L (ref 3.4–5.3)

## 2023-07-26 PROCEDURE — 250N000013 HC RX MED GY IP 250 OP 250 PS 637

## 2023-07-26 PROCEDURE — 84132 ASSAY OF SERUM POTASSIUM: CPT | Performed by: INTERNAL MEDICINE

## 2023-07-26 PROCEDURE — 97166 OT EVAL MOD COMPLEX 45 MIN: CPT | Mod: GO | Performed by: OCCUPATIONAL THERAPIST

## 2023-07-26 PROCEDURE — 250N000013 HC RX MED GY IP 250 OP 250 PS 637: Performed by: PHYSICIAN ASSISTANT

## 2023-07-26 PROCEDURE — 250N000011 HC RX IP 250 OP 636: Mod: JZ

## 2023-07-26 PROCEDURE — 99232 SBSQ HOSP IP/OBS MODERATE 35: CPT | Performed by: HOSPITALIST

## 2023-07-26 PROCEDURE — 85018 HEMOGLOBIN: CPT

## 2023-07-26 PROCEDURE — 97110 THERAPEUTIC EXERCISES: CPT | Mod: GP

## 2023-07-26 PROCEDURE — 36415 COLL VENOUS BLD VENIPUNCTURE: CPT | Performed by: INTERNAL MEDICINE

## 2023-07-26 PROCEDURE — 120N000001 HC R&B MED SURG/OB

## 2023-07-26 PROCEDURE — 97116 GAIT TRAINING THERAPY: CPT | Mod: GP

## 2023-07-26 PROCEDURE — 82947 ASSAY GLUCOSE BLOOD QUANT: CPT | Performed by: INTERNAL MEDICINE

## 2023-07-26 PROCEDURE — 250N000013 HC RX MED GY IP 250 OP 250 PS 637: Performed by: HOSPITALIST

## 2023-07-26 PROCEDURE — 97535 SELF CARE MNGMENT TRAINING: CPT | Mod: GO | Performed by: OCCUPATIONAL THERAPIST

## 2023-07-26 RX ORDER — METHYLPREDNISOLONE SODIUM SUCCINATE 125 MG/2ML
125 INJECTION, POWDER, LYOPHILIZED, FOR SOLUTION INTRAMUSCULAR; INTRAVENOUS
Status: DISCONTINUED | OUTPATIENT
Start: 2023-07-26 | End: 2023-07-28

## 2023-07-26 RX ORDER — ALBUTEROL SULFATE 90 UG/1
1-2 AEROSOL, METERED RESPIRATORY (INHALATION)
Status: DISCONTINUED | OUTPATIENT
Start: 2023-07-26 | End: 2023-07-28

## 2023-07-26 RX ORDER — DIPHENHYDRAMINE HYDROCHLORIDE 50 MG/ML
50 INJECTION INTRAMUSCULAR; INTRAVENOUS
Status: DISCONTINUED | OUTPATIENT
Start: 2023-07-26 | End: 2023-07-28

## 2023-07-26 RX ORDER — HYDROXYZINE HYDROCHLORIDE 10 MG/1
10-20 TABLET, FILM COATED ORAL EVERY 6 HOURS PRN
Status: DISCONTINUED | OUTPATIENT
Start: 2023-07-26 | End: 2023-07-28 | Stop reason: HOSPADM

## 2023-07-26 RX ORDER — ALBUTEROL SULFATE 0.83 MG/ML
2.5 SOLUTION RESPIRATORY (INHALATION)
Status: DISCONTINUED | OUTPATIENT
Start: 2023-07-26 | End: 2023-07-28

## 2023-07-26 RX ORDER — MEPERIDINE HYDROCHLORIDE 25 MG/ML
25 INJECTION INTRAMUSCULAR; INTRAVENOUS; SUBCUTANEOUS EVERY 30 MIN PRN
Status: DISCONTINUED | OUTPATIENT
Start: 2023-07-26 | End: 2023-07-28

## 2023-07-26 RX ADMIN — ENOXAPARIN SODIUM 40 MG: 40 INJECTION SUBCUTANEOUS at 09:11

## 2023-07-26 RX ADMIN — ASPIRIN 81 MG 81 MG: 81 TABLET ORAL at 09:11

## 2023-07-26 RX ADMIN — OXYCODONE HYDROCHLORIDE 10 MG: 5 TABLET ORAL at 05:33

## 2023-07-26 RX ADMIN — PANTOPRAZOLE SODIUM 40 MG: 40 TABLET, DELAYED RELEASE ORAL at 09:11

## 2023-07-26 RX ADMIN — OXYCODONE HYDROCHLORIDE 10 MG: 5 TABLET ORAL at 09:10

## 2023-07-26 RX ADMIN — CYCLOBENZAPRINE 10 MG: 10 TABLET, FILM COATED ORAL at 22:17

## 2023-07-26 RX ADMIN — CYCLOBENZAPRINE 10 MG: 10 TABLET, FILM COATED ORAL at 09:11

## 2023-07-26 RX ADMIN — METHOCARBAMOL 500 MG: 500 TABLET ORAL at 02:19

## 2023-07-26 RX ADMIN — ATORVASTATIN CALCIUM 40 MG: 40 TABLET, FILM COATED ORAL at 22:17

## 2023-07-26 RX ADMIN — ACETAMINOPHEN 975 MG: 325 TABLET, FILM COATED ORAL at 22:17

## 2023-07-26 RX ADMIN — SERTRALINE HYDROCHLORIDE 50 MG: 50 TABLET ORAL at 22:17

## 2023-07-26 RX ADMIN — AMITRIPTYLINE HYDROCHLORIDE 10 MG: 10 TABLET, FILM COATED ORAL at 22:17

## 2023-07-26 RX ADMIN — OXYCODONE HYDROCHLORIDE 10 MG: 5 TABLET ORAL at 01:22

## 2023-07-26 RX ADMIN — CYCLOBENZAPRINE 10 MG: 10 TABLET, FILM COATED ORAL at 17:14

## 2023-07-26 RX ADMIN — SENNOSIDES AND DOCUSATE SODIUM 1 TABLET: 50; 8.6 TABLET ORAL at 09:11

## 2023-07-26 RX ADMIN — ACETAMINOPHEN 975 MG: 325 TABLET, FILM COATED ORAL at 14:15

## 2023-07-26 RX ADMIN — ACETAMINOPHEN 975 MG: 325 TABLET, FILM COATED ORAL at 05:33

## 2023-07-26 RX ADMIN — SENNOSIDES AND DOCUSATE SODIUM 1 TABLET: 50; 8.6 TABLET ORAL at 22:16

## 2023-07-26 RX ADMIN — OXYCODONE HYDROCHLORIDE 10 MG: 5 TABLET ORAL at 14:17

## 2023-07-26 RX ADMIN — METHOCARBAMOL 500 MG: 500 TABLET ORAL at 09:10

## 2023-07-26 RX ADMIN — Medication 50 MCG: at 09:10

## 2023-07-26 RX ADMIN — OXYCODONE HYDROCHLORIDE 10 MG: 5 TABLET ORAL at 22:16

## 2023-07-26 ASSESSMENT — ACTIVITIES OF DAILY LIVING (ADL)
ADLS_ACUITY_SCORE: 37
PREVIOUS_RESPONSIBILITIES: MEAL PREP;LAUNDRY;SHOPPING;MEDICATION MANAGEMENT;DRIVING
ADLS_ACUITY_SCORE: 37
DEPENDENT_IADLS:: INDEPENDENT
ADLS_ACUITY_SCORE: 37
ADLS_ACUITY_SCORE: 37

## 2023-07-26 NOTE — CONSULTS
Care Management Initial Consult    General Information  Assessment completed with: Patient, Spouse or significant other, Patinet/spouse       Primary Care Provider verified and updated as needed: Yes   Readmission within the last 30 days:        Reason for Consult: discharge planning  Advance Care Planning: Advance Care Planning Reviewed: no concerns identified          Communication Assessment  Patient's communication style: spoken language (English or Bilingual)    Hearing Difficulty or Deaf: no        Cognitive  Cognitive/Neuro/Behavioral: WDL  Level of Consciousness: sedated, obtunded  Arousal Level: arouses to voice  Orientation: other (see comments) (ANGEL)             Living Environment:   People in home: spouse     Current living Arrangements: house      Able to return to prior arrangements: yes       Family/Social Support:  Care provided by: self  Provides care for: no one  Marital Status:     Derek       Description of Support System: Supportive, Involved    Support Assessment: Adequate family and caregiver support, Adequate social supports    Current Resources:   Patient receiving home care services: No     Community Resources: None  Equipment currently used at home: none  Supplies currently used at home: None    Employment/Financial:  Employment Status:          Financial Concerns: No concerns identified   Referral to Financial Worker: No       Does the patient's insurance plan have a 3 day qualifying hospital stay waiver?  Yes   Will the waiver be used for post-acute placement? Yes    Lifestyle & Psychosocial Needs:  Social Determinants of Health     Tobacco Use: Not on file   Alcohol Use: Not on file   Financial Resource Strain: Not on file   Food Insecurity: Not on file   Transportation Needs: Not on file   Physical Activity: Not on file   Stress: Not on file   Social Connections: Not on file   Intimate Partner Violence: Not on file   Depression: Not on file   Housing Stability: Not on file        Functional Status:  Prior to admission patient needed assistance:   Dependent ADLs:: Independent  Dependent IADLs:: Independent       Mental Health Status:  Mental Health Status: No Current Concerns       Chemical Dependency Status:  Chemical Dependency Status: No Current Concerns             Values/Beliefs:  Spiritual, Cultural Beliefs, Religion Practices, Values that affect care: no               Additional Information:  Per Care Management/social work consult for discharge planning. Patient admitted on 07/24 due to a Hip Fracture. SW reviewed chart and spoke with patient/spouse. Per patients report at baseline she is independent with all cares and does not have any assistive devices. Patients house has 1 Richar and 14 Stairs within the home to her bedroom. SW discussed recommendation for TCU and patient is in agreement. Sw provided TCU list and patient/spouse are reviewing and will provide choices for Sw. SW discussed preference of private vs shared room and patient would like a shared. SW will continue to follow.    JORGE Covarrubias    [facility]

## 2023-07-26 NOTE — PROVIDER NOTIFICATION
Notified provider about indwelling strauss catheter discussed removal or continued need.    Did provider choose to remove indwelling strauss catheter? No    Provider's strauss indication for keeping indwelling strauss catheter: Retention.    Is there an order for indwelling strauss catheter? Yes    *If there is a plan to keep strauss catheter in place at discharge daily notification with provider is not necessary, but please add a notation in the treatment team sticky note that the patient will be discharging with the catheter.

## 2023-07-26 NOTE — PROGRESS NOTES
Orthopedic Surgery  Jennifer Llanos  07/26/2023     Admit Date:  7/24/2023    POD: 2 Days Post-Op   Procedure(s):  INTERNAL FIXATION LEFT HIP USING INTRAMEDULLARY NAIL     Patient resting comfortably in bed.    Pain controlled.  Continues to have some left LE numbness from knee to anterior tibia.     Tolerating oral intake.    Denies nausea or vomiting  Denies chest pain or shortness of breath    Temp:  [98  F (36.7  C)-98.4  F (36.9  C)] 98  F (36.7  C)  Pulse:  [64-83] 83  Resp:  [16] 16  BP: (107-124)/(59-65) 124/65  SpO2:  [91 %-93 %] 93 %    Alert and oriented  Dressing is clean, dry, and intact.   Minimal erythema of the surrounding skin.   Bilateral calves are soft, non-tender.  Sensation decreased along mid left thigh/low leg region.  Sensation intact along left thigh.    Patient able to resist dorsi and plantar flexion bilaterally  +Dp pulse    Labs:  Recent Labs   Lab Test 07/26/23  0734 07/25/23  0841 07/24/23  0836   WBC  --  9.7 8.1   HGB 7.8* 8.4* 7.8*   PLT  --  211 225         1. PLAN:   Continue Lovenox while inpatient, ASA at discharge for DVT prophylaxis.     Mobilize with PT/OT    WBAT Left LE with walker.     Continue current pain regiment.  Added Atarax.    Dressings: Keep intact.  Change if >50% saturated or peeling off.    Follow-up: 2 weeks post-op with Dr FATMATA Celis team    2. Disposition   Anticipate d/c to 1-2 days.  TCU vs home pending PT progression.     Donna Dias PA-C

## 2023-07-26 NOTE — PROGRESS NOTES
[facility]    Hospitalist Progress Note    Interval History   Patient awake and alert.  He is to endorse numbness from about her left knee down to her foot.  Pain well controlled.    -Data reviewed today: I reviewed all new labs and imaging results over the last 24 hours. I personally reviewed no images or EKG's today.    Physical Exam   Temp: 98  F (36.7  C) Temp src: Oral BP: 124/65 Pulse: 83   Resp: 16 SpO2: 93 % O2 Device: None (Room air)    Vitals:    07/24/23 2125   Weight: 78.2 kg (172 lb 6.4 oz)     Vital Signs with Ranges  Temp:  [98  F (36.7  C)-98.4  F (36.9  C)] 98  F (36.7  C)  Pulse:  [64-83] 83  Resp:  [16] 16  BP: (107-124)/(59-65) 124/65  SpO2:  [91 %-93 %] 93 %  I/O last 3 completed shifts:  In: -   Out: 1975 [Urine:1975]    Physical Exam  Constitutional:       Appearance: Normal appearance.   HENT:      Head: Normocephalic.   Eyes:      Pupils: Pupils are equal, round, and reactive to light.   Cardiovascular:      Rate and Rhythm: Normal rate and regular rhythm.      Pulses: Normal pulses.      Heart sounds: Normal heart sounds.   Pulmonary:      Effort: Pulmonary effort is normal. No respiratory distress.      Breath sounds: Normal breath sounds.   Abdominal:      General: Abdomen is flat. Bowel sounds are normal. There is no distension.      Tenderness: There is no abdominal tenderness. There is no guarding.   Musculoskeletal:         General: Normal range of motion.      Cervical back: Normal range of motion.      Comments: Left surgical hip site appears stable   Skin:     General: Skin is warm and dry.      Coloration: Skin is pale.   Neurological:      General: No focal deficit present.   Psychiatric:         Mood and Affect: Mood normal.           Medications      acetaminophen  975 mg Oral Q8H    amitriptyline  10 mg Oral At Bedtime    aspirin  81 mg Oral Every Other Day    atorvastatin  40 mg Oral At Bedtime    cyclobenzaprine  10 mg Oral TID    enoxaparin  ANTICOAGULANT  40 mg Subcutaneous Q24H    insulin aspart  1-7 Units Subcutaneous TID AC    insulin aspart  1-5 Units Subcutaneous At Bedtime    pantoprazole  40 mg Oral QAM    polyethylene glycol  17 g Oral Daily    senna-docusate  1 tablet Oral BID    sertraline  50 mg Oral QPM    sodium chloride (PF)  3 mL Intracatheter Q8H    cholecalciferol  50 mcg Oral Daily       Data   Recent Labs   Lab 07/26/23  1137 07/26/23  0757 07/26/23  0734 07/25/23  1151 07/25/23  0841 07/24/23  1040 07/24/23  0836   WBC  --   --   --   --  9.7  --  8.1   HGB  --   --  7.8*  --  8.4*  --  7.8*   MCV  --   --   --   --  73*  --  73*   PLT  --   --   --   --  211  --  225   NA  --   --   --   --   --   --  142   POTASSIUM  --   --  3.7  --   --   --  4.1   CHLORIDE  --   --   --   --   --   --  110*   CO2  --   --   --   --   --   --  22   BUN  --   --   --   --   --   --  14.2   CR  --   --   --   --   --   --  0.48*   ANIONGAP  --   --   --   --   --   --  10   RHONDA  --   --   --   --   --   --  8.4*   * 162* 160*   < >  --    < > 115*    < > = values in this interval not displayed.         No results found for this or any previous visit (from the past 24 hour(s)).        Assessment & Plan      Assessment & Plan  Jennifer Llanos is a 68 year old female with past medical history including hyperlipidemia, diabetes mellitus type 2, depression who presented to OS with fall and hip pain, found to have left intertrochanteric fracture and transferred 7/24/2023 for orthopedic surgery evaluation.      Acute left intertrochanteric fracture, mildly displaced  *Presents to [place] ED with fall from standing height trying to sit on a bench and missing. Transferred for orthopedic surgery evaluation   *XR with above fracture   *Outside EKG reviewed, normal sinus rhythm   - Orthopedic surgery consulted.   -Underwent internal fixation of the left hip with intramedullary nail on 7/24/2023  -Postop pain better controlled.  -PT OT consulted.  -  Oxycodone PO PRN, hydromorphone IV PRN   -Would recommend minimizing NSAID use until GI work-up in the setting of severe unexplained iron deficiency anemia.  -Continue on Lovenox for DVT prophylaxis.  Plan on high-dose aspirin upon discharge.  -He recommends TCU placement.  Social work following.     Diabetes mellitus, type 2, diet-controlled   No recent HgbA1c available. Prior to admission reports being on sitagliptin, no insulin.  - HgbA1c 6.9  - Medium dose sliding scale insulin with carb controlled diet  - Hold oral medications while hospitalized  - Hypoglycemia protocol     Cardiac murmur  *Benign sounding  *Asymptomatic  *No prior echocardiogram   *Anemia may be contributing as below  - Noted .  Consider obtaining echocardiogram as an outpatient     Depression  -On amitriptyline and sertraline     Hyperlipidemia  -On atorvastatin    Microcytic anemia  Outside labs with MCV 71.4, Hgb 8.4 g/dl. No baseline available for comparison.  - Iron studies, ferritin show severe iron deficiency anemia.  -Hemoglobin here prior to surgery on 7/24/2023 was 7.8.  Ordered 1 unit packed RBCs in anticipation for blood loss with surgery.  Hemoglobin postop after 1 unit of packed RBCs is 8.4.  -Patient has unexplained severe iron deficiency with microcytic anemia.  Will order IV iron 300 mg every 48 hours for 3 doses while she is here.  -[facility] team evaluated patient.  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  Currently does not have any melanotic stool.  She likely is not absorbing iron well.  Will start her on oral iron supplementation upon discharge.  No need for emergent EGD or colonoscopy at this time.  - on pantoprazole 40 daily.  -Hemoglobin did trend down to 7.8 this morning.  Will transfuse as necessary for hemoglobin less than 7.       Tobacco use disorder  - Encouraged cessation  - Declines nicotine replacement       Diet: regular diet  "  DVT Prophylaxis: Pneumatic Compression Devices  Edwards Catheter: Not present  Code Status: Full Code    Clinically Significant Risk Factors                  # Hypertension: Noted on problem list         # DMII: A1C = 6.9 % (Ref range: <5.7 %) within past 6 months  , PRESENT ON ADMISSION  # Overweight: Estimated body mass index is 29.59 kg/m  as calculated from the following:    Height as of this encounter: 1.626 m (5' 4\").    Weight as of this encounter: 78.2 kg (172 lb 6.4 oz).  , PRESENT ON ADMISSION           DVT Prophylaxis: aspirin  Code Status: Full Code  Disposition: Expected discharge to TCU versus home in 2 days      Melia Mejia MD, MD  [phone](p)   "

## 2023-07-26 NOTE — PLAN OF CARE
Goal Outcome Evaluation:    Pt A&Ox4. VSS on RA. Regular diet. Up 1A GB&W. CMS intact except numbness present on LLE. L hip dressing CDI. Pain controlled w/ PRN oxy, robaxin and scheduled tylenol & flexeril. Discharge pending TCU placement. Ortho and GI following. Continue plan of care.

## 2023-07-26 NOTE — PROGRESS NOTES
"    Gastroenterology Follow Up Note    Jennifer Llanos MRN#  [id number]   Age:  68 year old YOB: 1955    Date of Admission:  7/24/2023     Subjective   No acute events overnight.  Reports ongoing pain over left lower extremity that was exacerbated after transfer from chair to bed.  Otherwise remains asymptomatic from GI standpoint.  No bowel movements since surgery 2 days ago.  Denies any abdominal pain, nausea, or emesis.  Hemoglobin with mild drop overnight noted.  Remains afebrile and hemodynamically stable.     MEDICATIONS & ALLERGIES   Current medication list reviewed.      Allergies   Allergen Reactions    Levofloxacin Unknown          OBJECTIVE   Vitals /65 (BP Location: Right arm)   Pulse 83   Temp 98  F (36.7  C) (Oral)   Resp 16   Ht 1.626 m (5' 4\")   Wt 78.2 kg (172 lb 6.4 oz)   SpO2 93%   BMI 29.59 kg/m          General:   Well-developed elderly CF in NAD.   HEENT:   NC/AT. MMM.   Lungs:  No respiratory distress.   Heart:  RRR. +S1, S2.    Abdomen:   Soft. NT/ND. BS active.    Neuro:   A&O x3. No focal deficits noted.    Psych:  Appears to have normal affect and mood.   Skin:  No obvious rashes or lesions noted.         LABORATORY AND IMAGING    ELECTROLYTE PANEL   Recent Labs   Lab Test 07/26/23  0734 07/24/23  0836 03/15/23  1159 07/25/22  1544   POTASSIUM 3.7 4.1 4.9 4.9   BUN  --  14.2 14.4 11.6      HEMATOLOGY PANEL   Recent Labs   Lab Test 07/25/23  0841 07/24/23  0836   WBC 9.7 8.1   MCV 73* 73*      LIVER AND PANCREAS PANEL   Recent Labs   Lab Test 03/15/23  1159 07/25/22  1544 11/05/20  1646   ALBUMIN 4.3 4.2 4.1      I have reviewed the current diagnostic and laboratory tests.     Assessment / Recommendations:     Assessment:  Iron deficiency anemia of unclear etiology.  No overt GI bleeding or associated GI symptoms.  Extensive chart review suggests prior blood work in 2017 consistent with iron deficiency and mild anemia at Hgb around 11 g/dL - though current " hemoglobin on admission lower at 7.8 g/dL.  Patient unaware of prior diagnosis of iron deficiency and not on any supplements prior to admission.  Suspect decreased iron absorption in setting of prior german-en-y anatomy along with possible slow pace GI blood loss from anastomotic site ulceration, angiectasia, Ignacio lesions, peptic ulcer disease, or less likely neoplastic process.  Clinical presentation not suggestive of but cannot exclude inflammatory bowel disease.  Prior EGD 2020 and colonoscopy 2019 reviewed.  Discussed possible repeat endoscopic evaluation with patient with preference to avoid any endoscopic procedure at this time unless clinically necessary.  With clinical stability, agree with deferring to outpatient procedures once acute orthopaedic rehab completed.  History of gastric surgery with german-en-y anatomy.    Recommendations:  No plan for urgent inpatient endoscopic procedures in setting of clinical stability and patient preference.  Outpatient EGD and colonoscopy with timing to be determined following acute orthopaedic rehab.  Would consider more urgent EGD and/or colonoscopy evaluation should patient develop any signs of overt GI bleeding or acute drop in hemoglobin.  Agree with IV iron infusion while inpatient with transition to oral formulation on discharge.  Monitor hemoglobin and transfuse as needed.  Continue Pantoprazole once daily.       Total time spent in chart review, direct medical discussion, examination, and documentation was 25 minutes.    Rodney West MD  [facility]  [phone]  I appreciate the opportunity to participate in the care of this patient.   Please feel free to call me with any questions or concerns.

## 2023-07-27 ENCOUNTER — APPOINTMENT (OUTPATIENT)
Dept: PHYSICAL THERAPY | Facility: CLINIC | Age: 68
DRG: 482 | End: 2023-07-27
Attending: INTERNAL MEDICINE
Payer: MEDICARE

## 2023-07-27 ENCOUNTER — APPOINTMENT (OUTPATIENT)
Dept: OCCUPATIONAL THERAPY | Facility: CLINIC | Age: 68
DRG: 482 | End: 2023-07-27
Attending: INTERNAL MEDICINE
Payer: MEDICARE

## 2023-07-27 LAB
ATRIAL RATE - MUSE: 74 BPM
DIASTOLIC BLOOD PRESSURE - MUSE: NORMAL MMHG
GLUCOSE BLDC GLUCOMTR-MCNC: 136 MG/DL (ref 70–99)
GLUCOSE BLDC GLUCOMTR-MCNC: 144 MG/DL (ref 70–99)
GLUCOSE BLDC GLUCOMTR-MCNC: 155 MG/DL (ref 70–99)
GLUCOSE BLDC GLUCOMTR-MCNC: 95 MG/DL (ref 70–99)
HGB BLD-MCNC: 7.6 G/DL (ref 11.7–15.7)
INTERPRETATION ECG - MUSE: NORMAL
P AXIS - MUSE: 52 DEGREES
PLATELET # BLD AUTO: 227 10E3/UL (ref 150–450)
POTASSIUM SERPL-SCNC: 3.6 MMOL/L (ref 3.4–5.3)
PR INTERVAL - MUSE: 172 MS
QRS DURATION - MUSE: 84 MS
QT - MUSE: 388 MS
QTC - MUSE: 430 MS
R AXIS - MUSE: 41 DEGREES
SYSTOLIC BLOOD PRESSURE - MUSE: NORMAL MMHG
T AXIS - MUSE: 35 DEGREES
VENTRICULAR RATE- MUSE: 74 BPM

## 2023-07-27 PROCEDURE — 250N000013 HC RX MED GY IP 250 OP 250 PS 637

## 2023-07-27 PROCEDURE — 250N000013 HC RX MED GY IP 250 OP 250 PS 637: Performed by: PHYSICIAN ASSISTANT

## 2023-07-27 PROCEDURE — 84132 ASSAY OF SERUM POTASSIUM: CPT | Performed by: INTERNAL MEDICINE

## 2023-07-27 PROCEDURE — 36415 COLL VENOUS BLD VENIPUNCTURE: CPT | Performed by: INTERNAL MEDICINE

## 2023-07-27 PROCEDURE — 120N000001 HC R&B MED SURG/OB

## 2023-07-27 PROCEDURE — 97535 SELF CARE MNGMENT TRAINING: CPT | Mod: GO

## 2023-07-27 PROCEDURE — 250N000013 HC RX MED GY IP 250 OP 250 PS 637: Performed by: INTERNAL MEDICINE

## 2023-07-27 PROCEDURE — 85049 AUTOMATED PLATELET COUNT: CPT

## 2023-07-27 PROCEDURE — 250N000011 HC RX IP 250 OP 636: Mod: JZ

## 2023-07-27 PROCEDURE — 250N000011 HC RX IP 250 OP 636: Mod: JZ | Performed by: HOSPITALIST

## 2023-07-27 PROCEDURE — 99232 SBSQ HOSP IP/OBS MODERATE 35: CPT | Performed by: HOSPITALIST

## 2023-07-27 PROCEDURE — 97116 GAIT TRAINING THERAPY: CPT | Mod: GP | Performed by: PHYSICAL THERAPY ASSISTANT

## 2023-07-27 PROCEDURE — 85018 HEMOGLOBIN: CPT | Performed by: PHYSICIAN ASSISTANT

## 2023-07-27 PROCEDURE — 250N000013 HC RX MED GY IP 250 OP 250 PS 637: Performed by: HOSPITALIST

## 2023-07-27 PROCEDURE — 258N000003 HC RX IP 258 OP 636: Performed by: HOSPITALIST

## 2023-07-27 PROCEDURE — 97530 THERAPEUTIC ACTIVITIES: CPT | Mod: GP | Performed by: PHYSICAL THERAPY ASSISTANT

## 2023-07-27 PROCEDURE — 97110 THERAPEUTIC EXERCISES: CPT | Mod: GP | Performed by: PHYSICAL THERAPY ASSISTANT

## 2023-07-27 RX ORDER — ENOXAPARIN SODIUM 100 MG/ML
40 INJECTION SUBCUTANEOUS EVERY 24 HOURS
Qty: 12 ML | Refills: 0 | DISCHARGE
Start: 2023-07-28 | End: 2023-08-27

## 2023-07-27 RX ADMIN — AMITRIPTYLINE HYDROCHLORIDE 10 MG: 10 TABLET, FILM COATED ORAL at 21:39

## 2023-07-27 RX ADMIN — Medication 50 MCG: at 09:41

## 2023-07-27 RX ADMIN — POLYETHYLENE GLYCOL 3350 17 G: 17 POWDER, FOR SOLUTION ORAL at 09:41

## 2023-07-27 RX ADMIN — PANTOPRAZOLE SODIUM 40 MG: 40 TABLET, DELAYED RELEASE ORAL at 09:41

## 2023-07-27 RX ADMIN — CYCLOBENZAPRINE 10 MG: 10 TABLET, FILM COATED ORAL at 09:41

## 2023-07-27 RX ADMIN — ACETAMINOPHEN 975 MG: 325 TABLET, FILM COATED ORAL at 13:52

## 2023-07-27 RX ADMIN — ATORVASTATIN CALCIUM 40 MG: 40 TABLET, FILM COATED ORAL at 21:39

## 2023-07-27 RX ADMIN — ENOXAPARIN SODIUM 40 MG: 40 INJECTION SUBCUTANEOUS at 09:41

## 2023-07-27 RX ADMIN — ACETAMINOPHEN 975 MG: 325 TABLET, FILM COATED ORAL at 05:15

## 2023-07-27 RX ADMIN — CYCLOBENZAPRINE 10 MG: 10 TABLET, FILM COATED ORAL at 21:39

## 2023-07-27 RX ADMIN — POLYETHYLENE GLYCOL 3350 17 G: 17 POWDER, FOR SOLUTION ORAL at 21:45

## 2023-07-27 RX ADMIN — SENNOSIDES AND DOCUSATE SODIUM 1 TABLET: 50; 8.6 TABLET ORAL at 09:41

## 2023-07-27 RX ADMIN — CYCLOBENZAPRINE 10 MG: 10 TABLET, FILM COATED ORAL at 16:51

## 2023-07-27 RX ADMIN — OXYCODONE HYDROCHLORIDE 10 MG: 5 TABLET ORAL at 05:15

## 2023-07-27 RX ADMIN — SERTRALINE HYDROCHLORIDE 50 MG: 50 TABLET ORAL at 21:39

## 2023-07-27 RX ADMIN — SENNOSIDES AND DOCUSATE SODIUM 1 TABLET: 50; 8.6 TABLET ORAL at 21:39

## 2023-07-27 RX ADMIN — IRON SUCROSE 300 MG: 20 INJECTION, SOLUTION INTRAVENOUS at 13:07

## 2023-07-27 RX ADMIN — OXYCODONE HYDROCHLORIDE 10 MG: 5 TABLET ORAL at 21:38

## 2023-07-27 ASSESSMENT — ACTIVITIES OF DAILY LIVING (ADL)
ADLS_ACUITY_SCORE: 37

## 2023-07-27 NOTE — PLAN OF CARE
Goal Outcome Evaluation:       Pt A&Ox4. VSS on RA. Regular diet. Up 1A GB&W. CMS intact except numbness present on LLE. L hip dressing CDI. Pain controlled w/ PRN oxy, robaxin and scheduled tylenol & flexeril. Edwards patent. Discharge pending TCU placement. Ortho and GI following. Continue plan of care.

## 2023-07-27 NOTE — PROGRESS NOTES
Care Management Follow Up    Length of Stay (days): 3    Expected Discharge Date: 07/28/2023     Concerns to be Addressed:       Patient plan of care discussed at interdisciplinary rounds: Yes    Anticipated Discharge Disposition: Transitional Care     Anticipated Discharge Services: None  Anticipated Discharge DME: None    Patient/family educated on Medicare website which has current facility and service quality ratings: yes  Education Provided on the Discharge Plan:    Patient/Family in Agreement with the Plan: yes    Referrals Placed by CM/SW:    Private pay costs discussed: transportation costs    Additional Information:    Sw spoke at length with pt and spouse regarding TCU referrals and how to select facilities.  Pt and spouse selected facilities near [facility]; referrals sent.  Pt will need medical transport at discharge and is aware that this will be private pay..  Pt/spouse are concerned about quality of facilities given that their parents have been residents in SNFs in the past.  Pt/spouse asked that Sw call the Sw team at [facility] to see if they can recommend facilities on the [place] side of the [facility].  Sw explained that Sw does not have connections with these facilities and Sw may not have time to attempt to make these phone calls.  Pt stated that she prefers to go home with HC but understands her TCU need.  Sw explained that if care at TCU is poor quality, pt has the right to go home and receive HC services.  Sw to continue to follow.    CESARIO BonillaSW

## 2023-07-27 NOTE — PROGRESS NOTES
[facility]    Hospitalist Progress Note    Interval History   Patient awake and alert.  Quite frustrated about ongoing numbness of her left leg.  She intermittently complains that she is not being updated despite being updated by Ortho, GI team and hospitalist team consistently every day.    -Data reviewed today: I reviewed all new labs and imaging results over the last 24 hours. I personally reviewed no images or EKG's today.    Physical Exam   Temp: 99.2  F (37.3  C) Temp src: Oral BP: 113/67 Pulse: 83   Resp: 16 SpO2: (!) 86 % O2 Device: None (Room air)    Vitals:    07/24/23 2125   Weight: 78.2 kg (172 lb 6.4 oz)     Vital Signs with Ranges  Temp:  [98  F (36.7  C)-99.6  F (37.6  C)] 99.2  F (37.3  C)  Pulse:  [78-95] 83  Resp:  [16] 16  BP: (100-127)/(63-72) 113/67  SpO2:  [73 %-94 %] 86 %  I/O last 3 completed shifts:  In: -   Out: 625 [Urine:625]    Physical Exam  Constitutional:       Appearance: Normal appearance.   HENT:      Head: Normocephalic.   Eyes:      Pupils: Pupils are equal, round, and reactive to light.   Cardiovascular:      Rate and Rhythm: Normal rate and regular rhythm.      Pulses: Normal pulses.      Heart sounds: Normal heart sounds.   Pulmonary:      Effort: Pulmonary effort is normal. No respiratory distress.      Breath sounds: Normal breath sounds.   Abdominal:      General: Abdomen is flat. Bowel sounds are normal. There is no distension.      Tenderness: There is no abdominal tenderness. There is no guarding.   Musculoskeletal:         General: Normal range of motion.      Cervical back: Normal range of motion.      Comments: Left surgical hip site appears stable   Skin:     General: Skin is warm and dry.      Coloration: Skin is pale.   Neurological:      General: No focal deficit present.      Comments: Significant numbness from just about her left knee down to her ankle with no sensation whatsoever.  Have some motor activity and is able to barely bend  her knee but not very well.   Psychiatric:         Mood and Affect: Mood normal.           Medications      amitriptyline  10 mg Oral At Bedtime    aspirin  81 mg Oral Every Other Day    atorvastatin  40 mg Oral At Bedtime    cyclobenzaprine  10 mg Oral TID    enoxaparin ANTICOAGULANT  40 mg Subcutaneous Q24H    insulin aspart  1-7 Units Subcutaneous TID AC    insulin aspart  1-5 Units Subcutaneous At Bedtime    iron sucrose  300 mg Intravenous Once    pantoprazole  40 mg Oral QAM    polyethylene glycol  17 g Oral Daily    senna-docusate  1 tablet Oral BID    sertraline  50 mg Oral QPM    sodium chloride (PF)  3 mL Intracatheter Q8H    cholecalciferol  50 mcg Oral Daily       Data   Recent Labs   Lab 07/27/23  1136 07/27/23  0838 07/27/23  0616 07/27/23  0141 07/26/23  0757 07/26/23  0734 07/25/23  1151 07/25/23  0841 07/24/23  1040 07/24/23  0836   WBC  --   --   --   --   --   --   --  9.7  --  8.1   HGB  --   --  7.6*  --   --  7.8*  --  8.4*  --  7.8*   MCV  --   --   --   --   --   --   --  73*  --  73*   PLT  --   --  227  --   --   --   --  211  --  225   NA  --   --   --   --   --   --   --   --   --  142   POTASSIUM  --   --  3.6  --   --  3.7  --   --   --  4.1   CHLORIDE  --   --   --   --   --   --   --   --   --  110*   CO2  --   --   --   --   --   --   --   --   --  22   BUN  --   --   --   --   --   --   --   --   --  14.2   CR  --   --   --   --   --   --   --   --   --  0.48*   ANIONGAP  --   --   --   --   --   --   --   --   --  10   RHONDA  --   --   --   --   --   --   --   --   --  8.4*   * 155*  --  136*   < > 160*   < >  --    < > 115*    < > = values in this interval not displayed.         No results found for this or any previous visit (from the past 24 hour(s)).        Assessment & Plan      Assessment & Plan  Jennifer Llanos is a 68 year old female with past medical history including hyperlipidemia, diabetes mellitus type 2, depression who presented to OSH with fall and hip pain,  found to have left intertrochanteric fracture and transferred 7/24/2023 for orthopedic surgery evaluation.      Acute left intertrochanteric fracture, mildly displaced  *Presents to [place] ED with fall from standing height trying to sit on a bench and missing. Transferred for orthopedic surgery evaluation   *XR with above fracture   *Outside EKG reviewed, normal sinus rhythm   - Orthopedic surgery consulted.   -Underwent internal fixation of the left hip with intramedullary nail on 7/24/2023  -Postop pain better controlled.  -PT OT consulted.  - Oxycodone PO PRN, hydromorphone IV PRN   -Would recommend minimizing NSAID use until GI work-up in the setting of severe unexplained iron deficiency anemia.  -Continue on Lovenox for DVT prophylaxis upon discharge as well.  Would prefer to avoid aspirin due to risk of GI bleed with her previous history of Maria Luisa-en-Y bypass site ulcer.  -PT recommends TCU placement.  Social work following.     Diabetes mellitus, type 2, diet-controlled   No recent HgbA1c available. Prior to admission reports being on sitagliptin, no insulin.  - HgbA1c 6.9  - Medium dose sliding scale insulin with carb controlled diet  - Hold oral medications while hospitalized  - Hypoglycemia protocol     Cardiac murmur  *Benign sounding  *Asymptomatic  *No prior echocardiogram   *Anemia may be contributing as below  - Noted .  Consider obtaining echocardiogram as an outpatient     Depression  -On amitriptyline and sertraline     Hyperlipidemia  -On atorvastatin    Microcytic anemia  Outside labs with MCV 71.4, Hgb 8.4 g/dl. No baseline available for comparison.  - Iron studies, ferritin show severe iron deficiency anemia.  -Hemoglobin here prior to surgery on 7/24/2023 was 7.8.  Ordered 1 unit packed RBCs in anticipation for blood loss with surgery.  Hemoglobin postop after 1 unit of packed RBCs is 8.4.  -Patient has unexplained severe iron deficiency with microcytic anemia.  Will order IV iron 300 mg every  "48 hours for 3 doses while she is here.  -[place] [facility] team evaluated patient.  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  Currently does not have any melanotic stool.  She likely is not absorbing iron well.  Will start her on oral iron supplementation upon discharge.  No need for emergent EGD or colonoscopy at this time.  - on pantoprazole 40 daily.  -Hemoglobin did trend down to 7.6 this morning.  Will transfuse as necessary for hemoglobin less than 7.       Tobacco use disorder  - Encouraged cessation  - Declines nicotine replacement       Diet: regular diet   DVT Prophylaxis: Pneumatic Compression Devices  Edwards Catheter: Not present  Code Status: Full Code    Clinically Significant Risk Factors                  # Hypertension: Noted on problem list         # DMII: A1C = 6.9 % (Ref range: <5.7 %) within past 6 months  , PRESENT ON ADMISSION  # Overweight: Estimated body mass index is 29.59 kg/m  as calculated from the following:    Height as of this encounter: 1.626 m (5' 4\").    Weight as of this encounter: 78.2 kg (172 lb 6.4 oz).  , PRESENT ON ADMISSION           DVT Prophylaxis: aspirin  Code Status: Full Code  Disposition: Expected discharge to TCU versus home in 2 days      Melia Mejia MD, MD  [phone](p)   "

## 2023-07-27 NOTE — PLAN OF CARE
Trauma/Ortho/Medical (Choose one) Ortho    Diagnosis: Internal fixation left hip using intramedullary nail  POD#: 3  Mental Status: A&O x4  Activity/dangle Assist of 1 with gait belt and walker  Diet: Reg  Pain:Tylenol  Strauss/Voiding:via strauss  Tele/Restraints/Iso: NA  02/LDA: NASREEN/MAXIMUS  D/C Date: Tcu pending  Other Info: Dressing CDI. Numbness present on LLE. WBAT

## 2023-07-27 NOTE — PROGRESS NOTES
Orthopedic Surgery  Jennifer Llanos  07/27/2023     Admit Date:  7/24/2023    POD: 3 Days Post-Op   Procedure(s):  INTERNAL FIXATION LEFT HIP USING INTRAMEDULLARY NAIL     Patient resting comfortably in bed.    Pain controlled.  Continues to have some left LE numbness from knee to anterior/lateral tibia.   Also notes leg weakness.    Tolerating oral intake.    Denies nausea or vomiting  Denies chest pain or shortness of breath    Temp:  [98  F (36.7  C)-99.6  F (37.6  C)] 98  F (36.7  C)  Pulse:  [78-95] 95  Resp:  [16] 16  BP: (100-127)/(63-72) 100/67  SpO2:  [73 %-94 %] 86 %    Alert and oriented  Dressing is clean, dry, and intact.   Minimal erythema of the surrounding skin.   Bilateral calves are soft, non-tender.  Numbness along mid left thigh/low leg region.  Sensation intact along left proximal thigh and foot.    Unable to actively flex hip, can contract the quad and slightly actively extend left knee.    Patient able to resist dorsi and plantar flexion bilaterally  +Dp pulse    Labs:  Recent Labs   Lab Test 07/27/23  0616 07/26/23  0734 07/25/23  0841 07/24/23  0836   WBC  --   --  9.7 8.1   HGB  --  7.8* 8.4* 7.8*     --  211 225         1. PLAN:   Lovenox x 30 days (no ASA given anemia and Hx gastric bypass)    Mobilize with PT/OT    WBAT Left LE with walker.  Will trial knee immobilizer to avoid knee buckling   I will discuss leg numbness with Dr Celis and anesthesia.    Hemoglobin reordered   Hospitalist managing anemia treatment    Continue current pain regiment.     Dressings: Keep intact.  Change if >50% saturated or peeling off.    Follow-up: 2 weeks post-op with Dr FATMATA Celis team    2. Disposition   Anticipate d/c to 1-2 days.  TCU pending PT progression.     Donna Dias PA-C

## 2023-07-28 ENCOUNTER — APPOINTMENT (OUTPATIENT)
Dept: PHYSICAL THERAPY | Facility: CLINIC | Age: 68
DRG: 482 | End: 2023-07-28
Attending: INTERNAL MEDICINE
Payer: MEDICARE

## 2023-07-28 VITALS
RESPIRATION RATE: 16 BRPM | HEIGHT: 64 IN | TEMPERATURE: 98.2 F | SYSTOLIC BLOOD PRESSURE: 111 MMHG | OXYGEN SATURATION: 95 % | BODY MASS INDEX: 29.43 KG/M2 | DIASTOLIC BLOOD PRESSURE: 65 MMHG | HEART RATE: 75 BPM | WEIGHT: 172.4 LBS

## 2023-07-28 LAB
GLUCOSE BLDC GLUCOMTR-MCNC: 115 MG/DL (ref 70–99)
GLUCOSE BLDC GLUCOMTR-MCNC: 132 MG/DL (ref 70–99)
GLUCOSE BLDC GLUCOMTR-MCNC: 134 MG/DL (ref 70–99)
GLUCOSE SERPL-MCNC: 128 MG/DL (ref 70–99)
HGB BLD-MCNC: 8.2 G/DL (ref 11.7–15.7)
POTASSIUM SERPL-SCNC: 3.8 MMOL/L (ref 3.4–5.3)

## 2023-07-28 PROCEDURE — 82947 ASSAY GLUCOSE BLOOD QUANT: CPT | Performed by: HOSPITALIST

## 2023-07-28 PROCEDURE — 250N000013 HC RX MED GY IP 250 OP 250 PS 637: Performed by: PHYSICIAN ASSISTANT

## 2023-07-28 PROCEDURE — 97110 THERAPEUTIC EXERCISES: CPT | Mod: GP | Performed by: PHYSICAL THERAPY ASSISTANT

## 2023-07-28 PROCEDURE — 97116 GAIT TRAINING THERAPY: CPT | Mod: GP | Performed by: PHYSICAL THERAPY ASSISTANT

## 2023-07-28 PROCEDURE — 97530 THERAPEUTIC ACTIVITIES: CPT | Mod: GP | Performed by: PHYSICAL THERAPY ASSISTANT

## 2023-07-28 PROCEDURE — 250N000011 HC RX IP 250 OP 636: Mod: JZ

## 2023-07-28 PROCEDURE — 250N000013 HC RX MED GY IP 250 OP 250 PS 637: Performed by: HOSPITALIST

## 2023-07-28 PROCEDURE — 36415 COLL VENOUS BLD VENIPUNCTURE: CPT | Performed by: HOSPITALIST

## 2023-07-28 PROCEDURE — 250N000013 HC RX MED GY IP 250 OP 250 PS 637

## 2023-07-28 PROCEDURE — 99239 HOSP IP/OBS DSCHRG MGMT >30: CPT | Performed by: HOSPITALIST

## 2023-07-28 PROCEDURE — 36415 COLL VENOUS BLD VENIPUNCTURE: CPT | Performed by: STUDENT IN AN ORGANIZED HEALTH CARE EDUCATION/TRAINING PROGRAM

## 2023-07-28 PROCEDURE — 85018 HEMOGLOBIN: CPT | Performed by: STUDENT IN AN ORGANIZED HEALTH CARE EDUCATION/TRAINING PROGRAM

## 2023-07-28 PROCEDURE — 84132 ASSAY OF SERUM POTASSIUM: CPT | Performed by: HOSPITALIST

## 2023-07-28 RX ORDER — PANTOPRAZOLE SODIUM 40 MG/1
40 TABLET, DELAYED RELEASE ORAL EVERY MORNING
DISCHARGE
Start: 2023-07-29

## 2023-07-28 RX ORDER — HYDROXYZINE HYDROCHLORIDE 10 MG/1
10-20 TABLET, FILM COATED ORAL EVERY 6 HOURS PRN
DISCHARGE
Start: 2023-07-28

## 2023-07-28 RX ORDER — METHOCARBAMOL 500 MG/1
500 TABLET, FILM COATED ORAL EVERY 6 HOURS PRN
DISCHARGE
Start: 2023-07-28

## 2023-07-28 RX ORDER — FERROUS SULFATE 325(65) MG
325 TABLET ORAL
DISCHARGE
Start: 2023-07-28

## 2023-07-28 RX ADMIN — ASPIRIN 81 MG 81 MG: 81 TABLET ORAL at 09:26

## 2023-07-28 RX ADMIN — POLYETHYLENE GLYCOL 3350 17 G: 17 POWDER, FOR SOLUTION ORAL at 09:25

## 2023-07-28 RX ADMIN — ACETAMINOPHEN 650 MG: 325 TABLET, FILM COATED ORAL at 14:31

## 2023-07-28 RX ADMIN — SENNOSIDES AND DOCUSATE SODIUM 1 TABLET: 50; 8.6 TABLET ORAL at 09:26

## 2023-07-28 RX ADMIN — HYDROXYZINE HYDROCHLORIDE 20 MG: 10 TABLET ORAL at 14:31

## 2023-07-28 RX ADMIN — Medication 50 MCG: at 09:26

## 2023-07-28 RX ADMIN — ENOXAPARIN SODIUM 40 MG: 40 INJECTION SUBCUTANEOUS at 09:25

## 2023-07-28 RX ADMIN — PANTOPRAZOLE SODIUM 40 MG: 40 TABLET, DELAYED RELEASE ORAL at 09:26

## 2023-07-28 RX ADMIN — CYCLOBENZAPRINE 10 MG: 10 TABLET, FILM COATED ORAL at 09:26

## 2023-07-28 ASSESSMENT — ACTIVITIES OF DAILY LIVING (ADL)
ADLS_ACUITY_SCORE: 37

## 2023-07-28 NOTE — PROGRESS NOTES
"    Gastroenterology Follow Up Note    Jennifer Llanos MRN#  [id number]   Age:  68 year old YOB: 1955    Date of Admission:  7/24/2023     Subjective   No acute events overnight.  Reports feeling much better today with better management of hip pain.  Remains asymptomatic from GI standpoint.  Reports no bowel movements for 4 days now, though denies any abdominal pain, nausea, or emesis.  Hemoglobin with mild drop overnight noted.  Remains afebrile and hemodynamically stable.      Again discussed utility of endoscopic evaluation that patient would continue to defer at this time unless absolutely clinically necessary.       MEDICATIONS & ALLERGIES   Current medication list reviewed.      Allergies   Allergen Reactions    Levofloxacin Unknown          OBJECTIVE   Vitals /65 (BP Location: Right arm, Patient Position: Semi-Toure's, Cuff Size: Adult Regular)   Pulse 77   Temp 99  F (37.2  C) (Oral)   Resp 16   Ht 1.626 m (5' 4\")   Wt 78.2 kg (172 lb 6.4 oz)   SpO2 90%   BMI 29.59 kg/m          General:   Well-developed elderly CF in NAD.   HEENT:   NC/AT. MMM.   Lungs:  No respiratory distress.   Heart:  RRR. +S1, S2.    Abdomen:   Soft. NT/ND. BS active.    Neuro:   A&O x3. No focal deficits noted.    Psych:  Appears to have normal affect and mood.   Skin:  No obvious rashes or lesions noted.         LABORATORY AND IMAGING    ELECTROLYTE PANEL   Recent Labs   Lab Test 07/26/23  0734 07/24/23  0836 03/15/23  1159 07/25/22  1544   POTASSIUM 3.7 4.1 4.9 4.9   BUN  --  14.2 14.4 11.6      HEMATOLOGY PANEL   Recent Labs   Lab Test 07/25/23  0841 07/24/23  0836   WBC 9.7 8.1   MCV 73* 73*      LIVER AND PANCREAS PANEL   Recent Labs   Lab Test 03/15/23  1159 07/25/22  1544 11/05/20  1646   ALBUMIN 4.3 4.2 4.1      I have reviewed the current diagnostic and laboratory tests.     Assessment / Recommendations:     Assessment:  Iron deficiency anemia of unclear etiology.  No overt GI bleeding or " associated GI symptoms.  Extensive chart review suggests prior blood work in 2017 consistent with iron deficiency and mild anemia at Hgb around 11 g/dL - though hemoglobin on admission much lower at 7.8 g/dL.  Not on any iron supplements prior to admission.  Suspect decreased iron absorption in setting of prior german-en-y anatomy along with possible slow pace GI blood loss from anastomotic site ulceration, angiectasia, Ignacio lesions, peptic ulcer disease, or less likely neoplastic process.  Clinical presentation not suggestive of but cannot exclude inflammatory bowel disease.  Prior EGD 2020 and colonoscopy 2019 reviewed.  Discussed possible repeat endoscopic evaluation with patient with preference to avoid any endoscopic procedure at this time unless clinically necessary.  With clinical stability, agree with deferring to outpatient procedures once acute orthopaedic rehab completed.  History of gastric surgery with german-en-y anatomy.    Recommendations:  No plan for urgent inpatient endoscopic procedures in setting of clinical stability and patient preference.  Outpatient EGD and colonoscopy with timing to be determined following acute orthopaedic rehab.  Would consider more urgent EGD and/or colonoscopy evaluation should patient develop any signs of overt GI bleeding or acute drop in hemoglobin.  Agree with IV iron infusion while inpatient with transition to oral formulation on discharge.  Monitor hemoglobin and transfuse as needed.  Continue Pantoprazole once daily.       Total time spent in chart review, direct medical discussion, examination, and documentation was 20 minutes.    Rodney West MD  [facility]  [phone]  I appreciate the opportunity to participate in the care of this patient.   Please feel free to call me with any questions or concerns.

## 2023-07-28 NOTE — PROGRESS NOTES
Care Management Discharge Note    Discharge Date: 07/28/2023       Discharge Disposition: Transitional Care    Discharge Services: None    Discharge DME: None    Discharge Transportation: family or friend will provide    Private pay costs discussed: private room/amenity fees and transportation costs    Does the patient's insurance plan have a 3 day qualifying hospital stay waiver?  No    PAS Confirmation Code: [id number]  Patient/family educated on Medicare website which has current facility and service quality ratings: yes    Education Provided on the Discharge Plan:    Persons Notified of Discharge Plans: family, facility, patient, bedside RN  Patient/Family in Agreement with the Plan: yes    Handoff Referral Completed: No    Additional Information:  FERNANDO met with patient to discuss accepting facilities: [facility] for tomorrow and [facility] for today. Patient and spouse reported that the PA Jenny said she would be discharging tomorrow due to weakness and numbness. Dr. Mejia reported that the patient was medically ready so SW informed them that if that is the case they must go today to [facility]. They asked SW to call [facility] to inquire about an opening today, SW left a voicemail with Jocelyne at [phone] saying that the patient is ready today and asking for a bed today if possible.     ADD [id number]   SW contacted [facility]. Patient was accepted back to [facility] today and did not hear from [facility] until 1322. FERNANDO met with the patient and spouse to explain that the doctor has orders in, as she is medically ready to discharge. Patient expressed frustration with providers not being on the same page and having different plans as far as a discharge date. SW validated frustration and ensured her that each provider was in agreement about a discharge today. Patient reported concerns about her hemoglobin levels and said she needed the results before leaving, as she had just had blood  drawn. FERNANDO consulted with Dr. Mejia regarding the concerns; through chart review FERNANDO saw the lab was discontinued. FERNANDO confirmed discharge time with [facility] and faxed orders and one script. PAS completed and faxed.     PAS-RR    D: Per DHS regulation, FERNANDO completed and submitted PAS-RR to [place] [employer] Direct Connect via the Senior [employer] Line.  PAS-RR confirmation # is : [id number]    P: Further questions may be directed to Senior [employer] Line at #[phone], option #4 for PAS-RR staff.    ADD 1338  Hemoglobin labs were discontinued but as of this time there are results in chart from draw.     Beatrice Barcenas, STEVEN, [facility]   Social Work   [facility]

## 2023-07-28 NOTE — PLAN OF CARE
Patient is A&o x4. Assist of 1 with gait belt and walker. Vss on RA. Dressing CDI. Pain managed with Oxycodone and Atarax. Iv access removed, strauss removed. Discharging today via wheelchair.

## 2023-07-28 NOTE — PROGRESS NOTES
Orthopedic Surgery  Jennifer Llanos  07/28/2023     Admit Date:  7/24/2023    POD: 4 Days Post-Op   Procedure(s):  INTERNAL FIXATION LEFT HIP USING INTRAMEDULLARY NAIL     Patient resting in chair  Pain controlled.  Continues to have some left LE numbness from knee to anterior tibia.   Also notes leg weakness.  Donna called anesthesia to discuss today, and this may be due to the block still in effect - expectation would be for this to improve with time. They suggested scan to evaluate for hematoma causing compression. Jennifer does decline at this time. She will continue to monitor and work with PT. Overall, sensation appears the same today however she does find it easier to stand than she did yesterday.   Tolerating oral intake.    Denies nausea or vomiting  Denies chest pain or shortness of breath    Temp:  [98.2  F (36.8  C)-99.2  F (37.3  C)] 98.2  F (36.8  C)  Pulse:  [75-83] 75  Resp:  [16] 16  BP: (111-115)/(62-67) 111/65  SpO2:  [90 %-95 %] 95 %    Alert and oriented  Dressing is clean, dry, and intact.   Minimal erythema of the surrounding skin.   Bilateral calves are soft, non-tender.  Numbness along knee joint/low leg region (anterior aspect).  Sensation intact along left proximal thigh and foot, and medial/lateral lower leg. Sensation intact along dorsal and plantar aspects of left foot as well as first web space.   Unable to actively flex hip, can contract the quad and slightly actively extend left knee.    Patient able to resist dorsi and plantar flexion bilaterally  +Dp pulse    Labs:  Recent Labs   Lab Test 07/27/23  0616 07/26/23  0734 07/25/23  0841 07/24/23  0836   WBC  --   --  9.7 8.1   HGB 7.6* 7.8* 8.4* 7.8*     --  211 225       1. PLAN:   Lovenox x 30 days (no ASA given anemia and Hx gastric bypass)    Mobilize with PT/OT    WBAT Left LE with walker.   knee immobilizer to avoid knee buckling   Continue to monitor sensation of LLE, continue to work with PT   Hemoglobin ordered, 7.6  yesterday    Hospitalist managing anemia treatment    Continue current pain regimen   Dressings: Keep intact.  Change if >50% saturated or peeling off.    Follow-up: 2 weeks post-op with Dr FATMATA Celis team    2. Disposition   Anticipate d/c to 1-2 days.  TCU pending PT progression.     Renea Hartman PA-C

## 2023-07-28 NOTE — DISCHARGE SUMMARY
[facility]    Discharge Summary  Hospitalist    Date of Admission:  7/24/2023  Date of Discharge:  7/28/2023    Discharge Diagnoses   Closed fracture of left hip, initial encounter (H)    History of Present Illness   Jennifer Llanos is an 68 year old female who presented with fall with left hip intertrochanteric fracture    Hospital Course   Jennifer Llanos was admitted on 7/24/2023.  The following problems were addressed during her hospitalization:    Assessment & Plan  Jennifer Llanos is a 68 year old female with past medical history including hyperlipidemia, diabetes mellitus type 2, depression who presented to OSH with fall and hip pain, found to have left intertrochanteric fracture and transferred 7/24/2023 for orthopedic surgery evaluation.      Acute left intertrochanteric fracture, mildly displaced  *Presents to [facility] ED with fall from standing height trying to sit on a bench and missing. Transferred for orthopedic surgery evaluation   *XR with above fracture   *Outside EKG reviewed, normal sinus rhythm   - Orthopedic surgery consulted.   -Underwent internal fixation of the left hip with intramedullary nail on 7/24/2023  -Postop pain better controlled.  -PT OT consulted.  - Oxycodone PO PRN, hydromorphone IV PRN   -Would recommend minimizing NSAID use until GI work-up in the setting of severe unexplained iron deficiency anemia.  -Continue on Lovenox for DVT prophylaxis upon discharge as well.  Would prefer to avoid aspirin due to risk of GI bleed with her previous history of Maria Luisa-en-Y bypass site ulcer.  -PT recommends TCU placement.  Social work following.     Diabetes mellitus, type 2, diet-controlled   No recent HgbA1c available. Prior to admission reports being on sitagliptin, no insulin.  - HgbA1c 6.9  - Medium dose sliding scale insulin with carb controlled diet  - Hold oral medications while hospitalized  - Hypoglycemia protocol     Cardiac murmur  *Benign  "sounding  *Asymptomatic  *No prior echocardiogram   *Anemia may be contributing as below  - Noted .  Consider obtaining echocardiogram as an outpatient     Depression  -On amitriptyline and sertraline     Hyperlipidemia  -On atorvastatin    Microcytic anemia  Outside labs with MCV 71.4, Hgb 8.4 g/dl. No baseline available for comparison.  - Iron studies, ferritin show severe iron deficiency anemia.  -Hemoglobin here prior to surgery on 7/24/2023 was 7.8.  Ordered 1 unit packed RBCs in anticipation for blood loss with surgery.  Hemoglobin postop after 1 unit of packed RBCs is 8.4.  -Patient has unexplained severe iron deficiency with microcytic anemia.  Will order IV iron 300 mg every 48 hours for 3 doses while she is here.  -[place] [facility] team evaluated patient.  Patient has a history of Marial Uisa-en-Y bypass in the past with ulcer at the anastomotic site and the EGD a few years ago.  Currently does not have any melanotic stool.  She likely is not absorbing iron well.  Will start her on oral iron supplementation upon discharge.  No need for emergent EGD or colonoscopy at this time.  - on pantoprazole 40 daily.  -Hemoglobin stable at 8.4 at the time of discharge.  Will start on oral iron supplementation.  Follow-up CBC in 1 week.       Tobacco use disorder  - Encouraged cessation  - Declines nicotine replacement       Diet: regular diet   DVT Prophylaxis: Pneumatic Compression Devices  Edwards Catheter: Not present  Code Status: Full Code       Clinically Significant Risk Factors                  # Hypertension: Noted on problem list       # DMII: A1C = 6.9 % (Ref range: <5.7 %) within past 6 months   # Overweight: Estimated body mass index is 29.59 kg/m  as calculated from the following:    Height as of this encounter: 1.626 m (5' 4\").    Weight as of this encounter: 78.2 kg (172 lb 6.4 oz).            Melia Mejia MD, MD        Code Status   Full Code       Primary Care Physician   Physician No Ref-Primary    Physical " Exam   Temp: 98.2  F (36.8  C) Temp src: Oral BP: 111/65 Pulse: 75   Resp: 16 SpO2: 95 % O2 Device: None (Room air)    Vitals:    07/24/23 2125   Weight: 78.2 kg (172 lb 6.4 oz)     Vital Signs with Ranges  Temp:  [98.2  F (36.8  C)-99.2  F (37.3  C)] 98.2  F (36.8  C)  Pulse:  [75-83] 75  Resp:  [16] 16  BP: (111-115)/(62-67) 111/65  SpO2:  [90 %-95 %] 95 %  I/O last 3 completed shifts:  In: 490 [P.O.:490]  Out: 2225 [Urine:2225]    Physical Exam  Constitutional:       Appearance: Normal appearance.   HENT:      Head: Normocephalic.   Eyes:      Pupils: Pupils are equal, round, and reactive to light.   Cardiovascular:      Rate and Rhythm: Normal rate and regular rhythm.      Pulses: Normal pulses.      Heart sounds: Normal heart sounds.   Pulmonary:      Effort: Pulmonary effort is normal. No respiratory distress.      Breath sounds: Normal breath sounds.   Abdominal:      General: Abdomen is flat. Bowel sounds are normal. There is no distension.      Tenderness: There is no abdominal tenderness. There is no guarding.   Musculoskeletal:         General: Normal range of motion.      Cervical back: Normal range of motion.   Skin:     General: Skin is warm and dry.   Neurological:      General: No focal deficit present.   Psychiatric:         Mood and Affect: Mood normal.           Discharge Disposition   Discharged to short-term care facility  Condition at discharge: Stable    Consultations This Hospital Stay   ORTHOPEDIC SURGERY IP CONSULT  CARE MANAGEMENT / SOCIAL WORK IP CONSULT  PHYSICAL THERAPY ADULT IP CONSULT  OCCUPATIONAL THERAPY ADULT IP CONSULT  PHYSICAL THERAPY ADULT IP CONSULT  OCCUPATIONAL THERAPY ADULT IP CONSULT  GASTROENTEROLOGY IP CONSULT  PHYSICAL THERAPY ADULT IP CONSULT  OCCUPATIONAL THERAPY ADULT IP CONSULT    Time Spent on this Encounter   Melia MARTIN MD, personally saw the patient today and spent greater than 30 minutes discharging this patient.    Discharge Orders      General info for  SNF    Length of Stay Estimate: Short Term Care: Estimated # of Days <30  Condition at Discharge: Stable  Level of care:skilled   Rehabilitation Potential: Good  Admission H&P remains valid and up-to-date: Yes  Recent Chemotherapy: N/A  Use Nursing Home Standing Orders: Yes     Mantoux instructions    Give two-step Mantoux (PPD) Per Facility Policy Yes     Follow Up and recommended labs and tests    Please call as soon as possible to make an appointment to be seen in Dr. Oliver Celis's clinic at 2 weeks postop for a recheck of your surgical site, possible repeat x-rays, and wound care. If you are at a TCU at this time and they have x-ray capabilities, you may complete your wound care and x-rays at your TCU and have them send your images to Dr. Celis's office.     Dr. Celis's care coordinator is Nayeli Camejo. Please contact her at [phone] to schedule an appointment.       Dr. Celis sees patients at 2 clinic locations:  [facility]  [address] [place] [address] [id number] [place] [address] Orthopedics [facility]   [address] , [address], [place] [address]        Please call the on-call phone number [phone] during evenings, nights and weekends for any urgent needs. Prescription refills must be done during business hours by calling [phone].     Reason for your hospital stay    S/p left intertrochanteric femur fracture ORIF using CM device (DOS: 7/24/23)     Wound care    Site:  Left hip  Instructions:  Please leave dressing intact for 2 weeks postoperatively. Okay to change if saturated >60% or peeling. If an Aquacel or gauze/tegaderm is in place over your surgical sites, it is okay to shower without covering the dressings. If you have a soft dressing, you must securely cover your dressing while showering or sponge bathe. Absolutely no soaking or submersion. Please contact your orthopedic surgical team if persistent drainage or redness develops around the surgical site.      Additional Discharge Instructions    Pain after surgery is normal and expected.  You will have some amount of pain and swelling for several weeks after surgery.  These symptoms will improve with time.  There are several things you can do to help reduce your pain including: rest, ice, elevation, and using pain medications as needed. Contact your Surgeon Team if you have pain that persists or worsens after surgery despite rest, ice, elevation, and taking your medication(s) as prescribed. Contact your Surgeon Team if you have new numbness, tingling, or weakness in your operative extremity.    Swelling and/or bruising of the surgical extremity is common and may persist for several months after surgery. In addition to frequent icing and elevation, gentle compressive support with an ACE wrap or tubigrip may help with swelling. Apply compression regularly, removing at least twice daily to perform skin checks. Contact your Surgeon Team if your swelling increases and is NOT associated with an increase in your activity level, or if your swelling increases and is associated with redness and pain.    Ice can be used to control swelling and discomfort after surgery. Place a thin towel over your operative site and apply the ice pack overtop. Leave ice pack in place for 20 minutes, then remove for 20 minutes. Repeat this 20 minutes on/20 minutes off routine as often as tolerated.    Please contact your surgical team with any concerns for infection including increasing redness around your surgical site, pus-like drainage, fever, chills, or flu-like symptoms.     Activity - Up with assistive device     Activity - Up with nursing assistance     Weight bearing status    WBAT LLE with walker.     General info for SNF    Length of Stay Estimate: Short Term Care: Estimated # of Days <30  Condition at Discharge: Stable  Level of care:skilled   Rehabilitation Potential: Good  Admission H&P remains valid and up-to-date: Yes  Recent  Chemotherapy: N/A  Use Nursing Home Standing Orders: Yes     Mantoux instructions    Give two-step Mantoux (PPD) Per Facility Policy Yes     Intake and output    Every shift     Follow Up and recommended labs and tests    Follow up with alf physician.  The following labs/tests are recommended: cbc, bmp in 1 week .     Activity - Up ad elena     Reason for your hospital stay    Fall with hip fx     Physical Therapy Adult Consult    Evaluate and treat as clinically indicated.    Reason: S/p left intertrochanteric femur fracture ORIF using CM device (DOS: 7/24/23)     Occupational Therapy Adult Consult    Evaluate and treat as clinically indicated.    Reason: S/p left intertrochanteric femur fracture ORIF using CM device (DOS: 7/24/23)     Physical Therapy Adult Consult    Evaluate and treat as clinically indicated.    Reason:  weakness     Occupational Therapy Adult Consult    Evaluate and treat as clinically indicated.    Reason:  weakness     Fall precautions     Fall precautions     Crutches DME    DME Documentation: Describe the reason for need to support medical necessity: Impaired gait status post hip surgery. I, the undersigned, certify that the above prescribed supplies are medically necessary for this patient and is both reasonable and necessary in reference to accepted standards of medical practice in the treatment of this patient's condition and is not prescribed as a convenience.     Cane DME    DME Documentation: Describe the reason for need to support medical necessity: Impaired gait status post hip surgery. I, the undersigned, certify that the above prescribed supplies are medically necessary for this patient and is both reasonable and necessary in reference to accepted standards of medical practice in the treatment of this patient's condition and is not prescribed as a convenience.     Walker DME    : DME Documentation: Describe the reason for need to support medical necessity: Impaired gait  status post hip surgery. I, the undersigned, certify that the above prescribed supplies are medically necessary for this patient and is both reasonable and necessary in reference to accepted standards of medical practice in the treatment of this patient's condition and is not prescribed as a convenience.     Diet    Follow this diet upon discharge: Orders Placed This Encounter      Advance Diet as Tolerated: Regular Diet Adult     Discharge Medications   Current Discharge Medication List        START taking these medications    Details   acetaminophen (TYLENOL) 325 MG tablet Take 3 tablets (975 mg) by mouth every 8 hours as needed for mild pain  Qty: 50 tablet, Refills: 0    Associated Diagnoses: Closed fracture of left hip, initial encounter (H)      enoxaparin ANTICOAGULANT (LOVENOX) 40 MG/0.4ML syringe Inject 0.4 mLs (40 mg) Subcutaneous every 24 hours for 30 days  Qty: 12 mL, Refills: 0    Associated Diagnoses: Closed fracture of left hip, initial encounter (H)      ferrous sulfate (FEROSUL) 325 (65 Fe) MG tablet Take 1 tablet (325 mg) by mouth daily (with breakfast)    Associated Diagnoses: Closed fracture of left hip, initial encounter (H)      hydrOXYzine (ATARAX) 10 MG tablet Take 1-2 tablets (10-20 mg) by mouth every 6 hours as needed for other (adjuvant pain)    Associated Diagnoses: Closed fracture of left hip, initial encounter (H)      methocarbamol (ROBAXIN) 500 MG tablet Take 1 tablet (500 mg) by mouth every 6 hours as needed for muscle spasms    Associated Diagnoses: Closed fracture of left hip, initial encounter (H)      oxyCODONE (ROXICODONE) 5 MG tablet Take 1 tablet (5 mg) by mouth every 4 hours as needed for severe pain  Qty: 25 tablet, Refills: 0    Associated Diagnoses: Closed fracture of left hip, initial encounter (H)      pantoprazole (PROTONIX) 40 MG EC tablet Take 1 tablet (40 mg) by mouth every morning    Associated Diagnoses: Closed fracture of left hip, initial encounter (H)       polyethylene glycol (MIRALAX) 17 GM/Dose powder Take 17 g by mouth daily  Qty: 510 g, Refills: 0    Associated Diagnoses: Closed fracture of left hip, initial encounter (H)      senna-docusate (SENOKOT-S/PERICOLACE) 8.6-50 MG tablet Take 1 tablet by mouth 2 times daily  Qty: 21 tablet, Refills: 0    Associated Diagnoses: Closed fracture of left hip, initial encounter (H)      Vitamin D3 (CHOLECALCIFEROL) 25 mcg (1000 units) tablet Take 2 tablets (50 mcg) by mouth daily  Qty: 30 tablet, Refills: 0    Associated Diagnoses: Closed fracture of left hip, initial encounter (H)           CONTINUE these medications which have NOT CHANGED    Details   amitriptyline (ELAVIL) 10 MG tablet [AMITRIPTYLINE (ELAVIL) 10 MG TABLET] Take 10 mg by mouth bedtime.      atorvastatin (LIPITOR) 40 MG tablet [ATORVASTATIN (LIPITOR) 40 MG TABLET] Take 40 mg by mouth bedtime.      sertraline (ZOLOFT) 50 MG tablet [SERTRALINE (ZOLOFT) 50 MG TABLET] Take 50 mg by mouth every evening.       sitaGLIPtin (JANUVIA) 100 MG tablet [SITAGLIPTIN (JANUVIA) 100 MG TABLET] Take 100 mg by mouth bedtime.           STOP taking these medications       aspirin 81 mg chewable tablet Comments:   Reason for Stopping:         omeprazole (PRILOSEC) 40 MG DR capsule Comments:   Reason for Stopping:             Allergies   Allergies   Allergen Reactions    Levofloxacin Unknown     Data   Recent Labs   Lab Test 07/27/23  0616 07/26/23  0734 07/25/23  0841 07/24/23  0836   WBC  --   --  9.7 8.1   HGB 7.6* 7.8* 8.4* 7.8*   MCV  --   --  73* 73*     --  211 225      Recent Labs   Lab Test 07/28/23  0846 07/28/23  0837 07/28/23  0231 07/27/23  0838 07/27/23  0616 07/26/23  0757 07/26/23  0734 07/24/23  1040 07/24/23  0836 07/24/23  0702 03/15/23  1159 07/25/22  1544   NA  --   --   --   --   --   --   --   --  142  --  143 141   POTASSIUM 3.8  --   --   --  3.6  --  3.7  --  4.1  --  4.9 4.9   CHLORIDE  --   --   --   --   --   --   --   --  110*  --  107 106    CO2  --   --   --   --   --   --   --   --  22  --  25 22   BUN  --   --   --   --   --   --   --   --  14.2  --  14.4 11.6   CR  --   --   --   --   --   --   --   --  0.48*  --  0.66 0.61   ANIONGAP  --   --   --   --   --   --   --   --  10  --  11 13   RHONDA  --   --   --   --   --   --   --   --  8.4*  --  9.9 9.2   * 132* 115*   < >  --    < > 160*   < > 115*   < > 128* 118*    < > = values in this interval not displayed.         Results for orders placed or performed during the hospital encounter of 07/24/23   XR Femur Left 2 Views    Narrative    FEMUR TWO VIEWS LEFT  7/24/2023 1:05 PM     HISTORY: fx  COMPARISON: 7/23/2023      Impression    IMPRESSION: Acute intertrochanteric fracture of the proximal left  femur. The lesser trochanteric fracture fragment is displaced, similar  to prior. The remaining fracture line appears nondisplaced. There is  normal joint alignment. Mild left hip joint degenerative changes.  Osteopenia.    MARGAUX WINSTON MD         SYSTEM ID:  [id number]   XR Surgery KELLEY L/T 5 Min Fluoro w Stills    Narrative    EXAM: XR SURGERY KELLEY FLUORO LESS THAN 5 MIN W STILLS  LOCATION: [facility]  DATE: 7/24/2023    INDICATION: left hip open reduction  COMPARISON: 07/23/2023    TECHNIQUE: Intraoperative fluoroscopy performed during the patient's procedure.    FLUOROSCOPIC TIME: 0.6  NUMBER OF IMAGES: 4    FINDINGS: Seibert extends across the left femoral neck with short intramedullary federico proximal femur. This extends across a left femoral neck fracture, with a slightly displaced lesser trochanter fragment. The main fracture components are in anatomic   alignment. No evidence for complications.   XR Pelvis w Hip Port Left  1 View    Narrative    EXAM: XR PELVIS AND HIP PORTABLE LEFT 1 VIEW  LOCATION: [facility]  DATE: 7/24/2023    INDICATION: Status post hip surgery.  COMPARISON: None.      Impression    IMPRESSION: Postoperative changes  related to recent reduction and nailing for management of left proximal femur fracture. Mildly displaced left lesser trochanteric avulsive fracture. Gamma nail projects in the expected position. Expected soft tissue gas   and swelling lateral left hip. Chronic healed right superior and inferior pubic rami fracture deformities. Mild bilateral hip osteoarthritis. Surgical clips are seen overlying the pelvis. Degenerative change lumbosacral spine. Bladder catheter.

## 2023-07-28 NOTE — PROGRESS NOTES
SPIRITUAL HEALTH SERVICES Progress Note  [facility]    Visited Jennifer per nurse's request as Jennifer began to prepare for discharge to TCU. She was distraught and frustrated, and expressed the impression that she had no decision making authority in her care.    Plan: [facility] remains available.    BERNADETTE Siegel.   Intern    [facility] routine referrals *[phone]  [facility] available 24/7 for emergent requests/referrals, either by paging the on-call  or by entering an ASAP/STAT consult in Epic (this will also page the on-call ).

## 2023-07-28 NOTE — PLAN OF CARE
Diagnosis:Fall left hip fracture; left hip ORIF   POD#:3  Mental Status:AxOx4  Activity/dangle: Ax1 GB and walker   Diet: Regular   Pain:Oxycodone   Edwards/Voiding:Edwards, needs to be addressed by provider   02/LDA:S/L  D/C Date:TCU referrals sent

## 2023-07-28 NOTE — PLAN OF CARE
Goal Outcome Evaluation:    Trauma/Ortho/Medical (Choose one): Ortho     Diagnosis: L hip ORIF  POD# 4  Mental Status: A/O x4  Activity/dangle: 1 GB/walker  Diet: Regular  Pain: Denied  Edwards/Voiding:  Edwards cath  Tele/Restraints/Iso: NA  02/LDA: PIV sl  D/C Date:  pending TCU  Other Info: BG 0200 115 mg/dl. K protocol. Dressing CDI

## 2023-07-29 NOTE — PLAN OF CARE
Occupational Therapy Discharge Summary    Reason for therapy discharge:    Discharged to transitional care facility.    Progress towards therapy goal(s). See goals on Care Plan in [facility] electronic health record for goal details.  Goals partially met.  Barriers to achieving goals:   discharge from facility.    Therapy recommendation(s):    pt progressing with IP OT but still very below baseline function largely 2/2 pain. recommend discharge to TCU to increase function and safety for ADL/IADL.

## 2023-07-30 ENCOUNTER — LAB REQUISITION (OUTPATIENT)
Dept: LAB | Facility: CLINIC | Age: 68
End: 2023-07-30
Payer: MEDICARE

## 2023-07-30 ENCOUNTER — PATIENT OUTREACH (OUTPATIENT)
Dept: CARE COORDINATION | Facility: CLINIC | Age: 68
End: 2023-07-30
Payer: MEDICARE

## 2023-07-30 DIAGNOSIS — E11.9 TYPE 2 DIABETES MELLITUS WITHOUT COMPLICATIONS (H): ICD-10-CM

## 2023-07-30 NOTE — PROGRESS NOTES
[facility]    Background: Transitional Care Management program identified per system criteria and reviewed by [facility] team for possible outreach.    Assessment: Upon chart review, CCRC Team member will not proceed with patient outreach related to this episode of Transitional Care Management program due to reason below:    Non-[facility] TCU: CCRC team member noted patient discharged to TCU/ARU/LTACH. Patient is not established with a [facility] currently supported by Primary Care-Care Coordination therefore handoff to Primary Care-Care Coordination is not appropriate at this time.    Plan: Transitional Care Management episode addressed appropriately per reason noted above.      Kathleen Nam RN  [facility], [facility]    *Connected Care Resource Team does NOT follow patient ongoing. Referrals are identified based on internal discharge reports and the outreach is to ensure patient has an understanding of their discharge instructions.

## 2023-07-31 LAB
ANION GAP SERPL CALCULATED.3IONS-SCNC: 12 MMOL/L (ref 7–15)
BUN SERPL-MCNC: 8.9 MG/DL (ref 8–23)
CALCIUM SERPL-MCNC: 8.6 MG/DL (ref 8.8–10.2)
CHLORIDE SERPL-SCNC: 106 MMOL/L (ref 98–107)
CREAT SERPL-MCNC: 0.54 MG/DL (ref 0.51–0.95)
DEPRECATED HCO3 PLAS-SCNC: 21 MMOL/L (ref 22–29)
ERYTHROCYTE [DISTWIDTH] IN BLOOD BY AUTOMATED COUNT: 24.4 % (ref 10–15)
GFR SERPL CREATININE-BSD FRML MDRD: >90 ML/MIN/1.73M2
GLUCOSE SERPL-MCNC: 100 MG/DL (ref 70–99)
HCT VFR BLD AUTO: 31.9 % (ref 35–47)
HGB BLD-MCNC: 8.8 G/DL (ref 11.7–15.7)
MCH RBC QN AUTO: 21.7 PG (ref 26.5–33)
MCHC RBC AUTO-ENTMCNC: 27.6 G/DL (ref 31.5–36.5)
MCV RBC AUTO: 79 FL (ref 78–100)
PLATELET # BLD AUTO: 328 10E3/UL (ref 150–450)
POTASSIUM SERPL-SCNC: 4 MMOL/L (ref 3.4–5.3)
RBC # BLD AUTO: 4.06 10E6/UL (ref 3.8–5.2)
SODIUM SERPL-SCNC: 139 MMOL/L (ref 136–145)
WBC # BLD AUTO: 6.5 10E3/UL (ref 4–11)

## 2023-07-31 PROCEDURE — 85027 COMPLETE CBC AUTOMATED: CPT | Performed by: INTERNAL MEDICINE

## 2023-07-31 PROCEDURE — 80048 BASIC METABOLIC PNL TOTAL CA: CPT | Performed by: INTERNAL MEDICINE

## 2023-07-31 PROCEDURE — 36415 COLL VENOUS BLD VENIPUNCTURE: CPT | Performed by: INTERNAL MEDICINE

## 2023-07-31 PROCEDURE — P9604 ONE-WAY ALLOW PRORATED TRIP: HCPCS | Performed by: INTERNAL MEDICINE

## 2023-08-05 ENCOUNTER — HEALTH MAINTENANCE LETTER (OUTPATIENT)
Age: 68
End: 2023-08-05

## 2023-08-06 ENCOUNTER — LAB REQUISITION (OUTPATIENT)
Dept: LAB | Facility: CLINIC | Age: 68
End: 2023-08-06
Payer: MEDICARE

## 2023-08-06 DIAGNOSIS — E11.9 TYPE 2 DIABETES MELLITUS WITHOUT COMPLICATIONS (H): ICD-10-CM

## 2023-08-07 ENCOUNTER — LAB REQUISITION (OUTPATIENT)
Dept: LAB | Facility: CLINIC | Age: 68
End: 2023-08-07
Payer: MEDICARE

## 2023-08-07 DIAGNOSIS — D50.9 IRON DEFICIENCY ANEMIA, UNSPECIFIED: ICD-10-CM

## 2023-08-07 LAB
ANION GAP SERPL CALCULATED.3IONS-SCNC: 12 MMOL/L (ref 7–15)
BUN SERPL-MCNC: 13.6 MG/DL (ref 8–23)
CALCIUM SERPL-MCNC: 9.2 MG/DL (ref 8.8–10.2)
CHLORIDE SERPL-SCNC: 103 MMOL/L (ref 98–107)
CREAT SERPL-MCNC: 0.6 MG/DL (ref 0.51–0.95)
DEPRECATED HCO3 PLAS-SCNC: 23 MMOL/L (ref 22–29)
ERYTHROCYTE [DISTWIDTH] IN BLOOD BY AUTOMATED COUNT: ABNORMAL %
GFR SERPL CREATININE-BSD FRML MDRD: >90 ML/MIN/1.73M2
GLUCOSE SERPL-MCNC: 105 MG/DL (ref 70–99)
HCT VFR BLD AUTO: 38.3 % (ref 35–47)
HGB BLD-MCNC: 11 G/DL (ref 11.7–15.7)
MCH RBC QN AUTO: 23.5 PG (ref 26.5–33)
MCHC RBC AUTO-ENTMCNC: 28.7 G/DL (ref 31.5–36.5)
MCV RBC AUTO: 82 FL (ref 78–100)
PLATELET # BLD AUTO: 465 10E3/UL (ref 150–450)
POTASSIUM SERPL-SCNC: 4.3 MMOL/L (ref 3.4–5.3)
RBC # BLD AUTO: 4.68 10E6/UL (ref 3.8–5.2)
SODIUM SERPL-SCNC: 138 MMOL/L (ref 136–145)
WBC # BLD AUTO: 6 10E3/UL (ref 4–11)

## 2023-08-07 PROCEDURE — 85027 COMPLETE CBC AUTOMATED: CPT | Performed by: INTERNAL MEDICINE

## 2023-08-07 PROCEDURE — 80048 BASIC METABOLIC PNL TOTAL CA: CPT | Performed by: INTERNAL MEDICINE

## 2023-08-07 PROCEDURE — 36415 COLL VENOUS BLD VENIPUNCTURE: CPT | Performed by: INTERNAL MEDICINE

## 2023-08-08 LAB — IRON SERPL-MCNC: 44 UG/DL (ref 37–145)

## 2023-08-08 PROCEDURE — 83540 ASSAY OF IRON: CPT | Performed by: INTERNAL MEDICINE

## 2023-08-08 PROCEDURE — 36415 COLL VENOUS BLD VENIPUNCTURE: CPT | Performed by: INTERNAL MEDICINE

## 2023-08-08 PROCEDURE — P9604 ONE-WAY ALLOW PRORATED TRIP: HCPCS | Performed by: INTERNAL MEDICINE

## 2023-08-16 ENCOUNTER — LAB REQUISITION (OUTPATIENT)
Dept: LAB | Facility: CLINIC | Age: 68
End: 2023-08-16
Payer: MEDICARE

## 2023-08-16 DIAGNOSIS — D50.9 IRON DEFICIENCY ANEMIA, UNSPECIFIED: ICD-10-CM

## 2023-08-16 LAB
FERRITIN SERPL-MCNC: 165 NG/ML (ref 11–328)
IRON SERPL-MCNC: 79 UG/DL (ref 37–145)

## 2023-08-16 PROCEDURE — 82728 ASSAY OF FERRITIN: CPT | Performed by: PHYSICIAN ASSISTANT

## 2023-08-16 PROCEDURE — 83540 ASSAY OF IRON: CPT | Mod: ORL | Performed by: PHYSICIAN ASSISTANT

## 2023-12-23 ENCOUNTER — HEALTH MAINTENANCE LETTER (OUTPATIENT)
Age: 68
End: 2023-12-23

## 2023-12-26 ENCOUNTER — LAB REQUISITION (OUTPATIENT)
Dept: LAB | Facility: CLINIC | Age: 68
End: 2023-12-26
Payer: MEDICARE

## 2023-12-26 DIAGNOSIS — E11.9 TYPE 2 DIABETES MELLITUS WITHOUT COMPLICATIONS (H): ICD-10-CM

## 2023-12-26 DIAGNOSIS — D50.9 IRON DEFICIENCY ANEMIA, UNSPECIFIED: ICD-10-CM

## 2023-12-26 DIAGNOSIS — E78.5 HYPERLIPIDEMIA, UNSPECIFIED: ICD-10-CM

## 2023-12-26 LAB
ANION GAP SERPL CALCULATED.3IONS-SCNC: 11 MMOL/L (ref 7–15)
BUN SERPL-MCNC: 11.2 MG/DL (ref 8–23)
CALCIUM SERPL-MCNC: 9.3 MG/DL (ref 8.8–10.2)
CHLORIDE SERPL-SCNC: 108 MMOL/L (ref 98–107)
CHOLEST SERPL-MCNC: 127 MG/DL
CREAT SERPL-MCNC: 0.6 MG/DL (ref 0.51–0.95)
DEPRECATED HCO3 PLAS-SCNC: 21 MMOL/L (ref 22–29)
EGFRCR SERPLBLD CKD-EPI 2021: >90 ML/MIN/1.73M2
FASTING STATUS PATIENT QL REPORTED: NORMAL
GLUCOSE SERPL-MCNC: 166 MG/DL (ref 70–99)
HDLC SERPL-MCNC: 54 MG/DL
IRON SERPL-MCNC: 135 UG/DL (ref 37–145)
LDLC SERPL CALC-MCNC: 50 MG/DL
NONHDLC SERPL-MCNC: 73 MG/DL
POTASSIUM SERPL-SCNC: 4.1 MMOL/L (ref 3.4–5.3)
SODIUM SERPL-SCNC: 140 MMOL/L (ref 135–145)
TRIGL SERPL-MCNC: 116 MG/DL

## 2023-12-26 PROCEDURE — 80061 LIPID PANEL: CPT | Mod: ORL | Performed by: PHYSICIAN ASSISTANT

## 2023-12-26 PROCEDURE — 83540 ASSAY OF IRON: CPT | Mod: ORL | Performed by: PHYSICIAN ASSISTANT

## 2023-12-26 PROCEDURE — 80048 BASIC METABOLIC PNL TOTAL CA: CPT | Mod: ORL | Performed by: PHYSICIAN ASSISTANT

## 2024-01-04 ENCOUNTER — HOSPITAL ENCOUNTER (OUTPATIENT)
Dept: CARDIOLOGY | Facility: CLINIC | Age: 69
Discharge: HOME | End: 2024-01-04
Attending: PHYSICIAN ASSISTANT | Admitting: PHYSICIAN ASSISTANT
Payer: MEDICARE

## 2024-01-04 DIAGNOSIS — R01.1 HEART MURMUR: ICD-10-CM

## 2024-01-04 LAB — LVEF ECHO: NORMAL

## 2024-01-04 PROCEDURE — 93306 TTE W/DOPPLER COMPLETE: CPT | Mod: 26 | Performed by: INTERNAL MEDICINE

## 2024-01-04 PROCEDURE — 93306 TTE W/DOPPLER COMPLETE: CPT

## 2024-05-11 ENCOUNTER — HEALTH MAINTENANCE LETTER (OUTPATIENT)
Age: 69
End: 2024-05-11

## 2024-09-08 ENCOUNTER — APPOINTMENT (OUTPATIENT)
Dept: RADIOLOGY | Facility: CLINIC | Age: 69
End: 2024-09-08
Attending: EMERGENCY MEDICINE
Payer: MEDICARE

## 2024-09-08 ENCOUNTER — HOSPITAL ENCOUNTER (EMERGENCY)
Facility: CLINIC | Age: 69
Discharge: HOME | End: 2024-09-08
Attending: EMERGENCY MEDICINE | Admitting: EMERGENCY MEDICINE
Payer: MEDICARE

## 2024-09-08 VITALS
SYSTOLIC BLOOD PRESSURE: 126 MMHG | BODY MASS INDEX: 22.33 KG/M2 | DIASTOLIC BLOOD PRESSURE: 74 MMHG | HEIGHT: 65 IN | RESPIRATION RATE: 19 BRPM | OXYGEN SATURATION: 95 % | TEMPERATURE: 99.2 F | HEART RATE: 83 BPM | WEIGHT: 134 LBS

## 2024-09-08 DIAGNOSIS — M54.6 ACUTE RIGHT-SIDED THORACIC BACK PAIN: ICD-10-CM

## 2024-09-08 DIAGNOSIS — J98.11 ATELECTASIS: ICD-10-CM

## 2024-09-08 LAB
ANION GAP SERPL CALCULATED.3IONS-SCNC: 16 MMOL/L (ref 7–15)
ATRIAL RATE - MUSE: 88 BPM
BASOPHILS # BLD AUTO: 0 10E3/UL (ref 0–0.2)
BASOPHILS NFR BLD AUTO: 0 %
BUN SERPL-MCNC: 13.4 MG/DL (ref 8–23)
CALCIUM SERPL-MCNC: 9.7 MG/DL (ref 8.8–10.4)
CHLORIDE SERPL-SCNC: 104 MMOL/L (ref 98–107)
CREAT SERPL-MCNC: 0.63 MG/DL (ref 0.51–0.95)
D DIMER PPP FEU-MCNC: 0.51 UG/ML FEU (ref 0–0.5)
DIASTOLIC BLOOD PRESSURE - MUSE: NORMAL MMHG
EGFRCR SERPLBLD CKD-EPI 2021: >90 ML/MIN/1.73M2
EOSINOPHIL # BLD AUTO: 0.1 10E3/UL (ref 0–0.7)
EOSINOPHIL NFR BLD AUTO: 1 %
ERYTHROCYTE [DISTWIDTH] IN BLOOD BY AUTOMATED COUNT: 14.7 % (ref 10–15)
GLUCOSE SERPL-MCNC: 115 MG/DL (ref 70–99)
HCO3 SERPL-SCNC: 23 MMOL/L (ref 22–29)
HCT VFR BLD AUTO: 48.2 % (ref 35–47)
HGB BLD-MCNC: 15.9 G/DL (ref 11.7–15.7)
IMM GRANULOCYTES # BLD: 0.1 10E3/UL
IMM GRANULOCYTES NFR BLD: 1 %
INTERPRETATION ECG - MUSE: NORMAL
LYMPHOCYTES # BLD AUTO: 1.8 10E3/UL (ref 0.8–5.3)
LYMPHOCYTES NFR BLD AUTO: 17 %
MCH RBC QN AUTO: 30.7 PG (ref 26.5–33)
MCHC RBC AUTO-ENTMCNC: 33 G/DL (ref 31.5–36.5)
MCV RBC AUTO: 93 FL (ref 78–100)
MONOCYTES # BLD AUTO: 1 10E3/UL (ref 0–1.3)
MONOCYTES NFR BLD AUTO: 9 %
NEUTROPHILS # BLD AUTO: 7.9 10E3/UL (ref 1.6–8.3)
NEUTROPHILS NFR BLD AUTO: 73 %
NRBC # BLD AUTO: 0 10E3/UL
NRBC BLD AUTO-RTO: 0 /100
P AXIS - MUSE: 44 DEGREES
PLATELET # BLD AUTO: 215 10E3/UL (ref 150–450)
POTASSIUM SERPL-SCNC: 4.9 MMOL/L (ref 3.4–5.3)
PR INTERVAL - MUSE: 164 MS
QRS DURATION - MUSE: 78 MS
QT - MUSE: 352 MS
QTC - MUSE: 425 MS
R AXIS - MUSE: 8 DEGREES
RBC # BLD AUTO: 5.18 10E6/UL (ref 3.8–5.2)
SODIUM SERPL-SCNC: 143 MMOL/L (ref 135–145)
SYSTOLIC BLOOD PRESSURE - MUSE: NORMAL MMHG
T AXIS - MUSE: 21 DEGREES
VENTRICULAR RATE- MUSE: 88 BPM
WBC # BLD AUTO: 10.7 10E3/UL (ref 4–11)

## 2024-09-08 PROCEDURE — 99285 EMERGENCY DEPT VISIT HI MDM: CPT | Mod: 25 | Performed by: EMERGENCY MEDICINE

## 2024-09-08 PROCEDURE — 250N000013 HC RX MED GY IP 250 OP 250 PS 637: Performed by: EMERGENCY MEDICINE

## 2024-09-08 PROCEDURE — 85004 AUTOMATED DIFF WBC COUNT: CPT | Performed by: EMERGENCY MEDICINE

## 2024-09-08 PROCEDURE — 93005 ELECTROCARDIOGRAM TRACING: CPT | Performed by: EMERGENCY MEDICINE

## 2024-09-08 PROCEDURE — 80048 BASIC METABOLIC PNL TOTAL CA: CPT | Performed by: EMERGENCY MEDICINE

## 2024-09-08 PROCEDURE — 85379 FIBRIN DEGRADATION QUANT: CPT | Performed by: EMERGENCY MEDICINE

## 2024-09-08 PROCEDURE — 36415 COLL VENOUS BLD VENIPUNCTURE: CPT | Performed by: EMERGENCY MEDICINE

## 2024-09-08 PROCEDURE — 96374 THER/PROPH/DIAG INJ IV PUSH: CPT | Performed by: EMERGENCY MEDICINE

## 2024-09-08 PROCEDURE — 250N000011 HC RX IP 250 OP 636: Performed by: EMERGENCY MEDICINE

## 2024-09-08 PROCEDURE — 71046 X-RAY EXAM CHEST 2 VIEWS: CPT

## 2024-09-08 RX ORDER — LIDOCAINE 4 G/G
1 PATCH TOPICAL ONCE
Status: DISCONTINUED | OUTPATIENT
Start: 2024-09-08 | End: 2024-09-08 | Stop reason: HOSPADM

## 2024-09-08 RX ORDER — LIDOCAINE 50 MG/G
1 PATCH TOPICAL EVERY 24 HOURS
Qty: 10 PATCH | Refills: 0 | Status: SHIPPED | OUTPATIENT
Start: 2024-09-08 | End: 2024-09-18

## 2024-09-08 RX ORDER — KETOROLAC TROMETHAMINE 15 MG/ML
15 INJECTION, SOLUTION INTRAMUSCULAR; INTRAVENOUS ONCE
Status: COMPLETED | OUTPATIENT
Start: 2024-09-08 | End: 2024-09-08

## 2024-09-08 RX ORDER — NAPROXEN 500 MG/1
500 TABLET ORAL 2 TIMES DAILY WITH MEALS
Qty: 20 TABLET | Refills: 0 | Status: SHIPPED | OUTPATIENT
Start: 2024-09-08 | End: 2024-09-18

## 2024-09-08 RX ADMIN — KETOROLAC TROMETHAMINE 15 MG: 15 INJECTION, SOLUTION INTRAMUSCULAR; INTRAVENOUS at 16:05

## 2024-09-08 RX ADMIN — LIDOCAINE 1 PATCH: 4 PATCH TOPICAL at 16:07

## 2024-09-08 ASSESSMENT — ACTIVITIES OF DAILY LIVING (ADL)
ADLS_ACUITY_SCORE: 38
ADLS_ACUITY_SCORE: 38

## 2024-09-08 ASSESSMENT — COLUMBIA-SUICIDE SEVERITY RATING SCALE - C-SSRS
1. IN THE PAST MONTH, HAVE YOU WISHED YOU WERE DEAD OR WISHED YOU COULD GO TO SLEEP AND NOT WAKE UP?: NO
6. HAVE YOU EVER DONE ANYTHING, STARTED TO DO ANYTHING, OR PREPARED TO DO ANYTHING TO END YOUR LIFE?: NO
2. HAVE YOU ACTUALLY HAD ANY THOUGHTS OF KILLING YOURSELF IN THE PAST MONTH?: NO

## 2024-09-08 NOTE — ED NOTES
Pt states she has an IS at home so she doesn't want another one. She states she will use it.    LEIDA BALTAZAR RN

## 2024-09-08 NOTE — DISCHARGE INSTRUCTIONS
Take naproxen twice a day with food.  Use lidoderm patches as directed. Massage area and gently stretch.  Use incentive spirometer to ensure deep breaths, prevent further atelectasis and possible secondary pneumonia.  Follow up with primary clinic.

## 2024-09-08 NOTE — ED PROVIDER NOTES
Emergency Department Encounter     Evaluation Date & Time:   9/8/2024  3:28 PM    CHIEF COMPLAINT:  Back Pain      Triage Note:  Pt here with a specific spot on her right middle back that has gradual increased in pain. Now having pain with deep inspiration. Denies feeling short of breath just hurts to take a deep breath. Denies recent trauma but has been packing recently.       ED COURSE & MEDICAL DECISION MAKING:     Pt here with focal, right thoracic back pain since yesterday while moving boxes.  She's TTP along this spot with increased pain on movement, breathing.  Pt has no rash to suggest shingles. Suspect this is all MSK related, but CXR done from triage showed possible LLL infiltrate. Although pt has no cough, fevers or infectious symptoms to suggest PNA. As a result, will get labs, d-dimer to rule out overall low risk PE. Suspect this is all atelectasis on CXR from shallow breathing due to pain from back.  Anticipate home with meds for symptomatic cares.      ED Course as of 09/08/24 1707   Sun Sep 08, 2024   1641 D-dimer below age adjusted cutoff, no indication for CTA chest. Suspect CXR findings related to atelectasis from shallow breathing. Will send pt home with incentive spirometer to help.   1646 Pt re-evaluated, updated on all results.  She feels much better after toradol and lidoderm patches. Will send Rx for naproxen 500mg and lidoderm patches.  Instructed on outpatient follow up, stretching, massage, expected course.      Suspect MSK pain causing atelectasis on CXR.  Incentive spirometer for home as well.          Medical Decision Making    History:  Supplemental history from: Documented in chart  External Record(s) reviewed: Documented in chart    Work Up:  Chart documentation includes differential considered and any EKGs or imaging independently interpreted by provider, where specified.  In additional to work up documented, I considered the following work up: Documented in chart, if  applicable.    External consultation:  Discussion of management with another provider: Documented in chart, if applicable    Complicating factors:  Care impacted by chronic illness: Diabetes, Hyperlipidemia, and Other: osteoporosis  Care affected by social determinants of health: N/A    Disposition considerations: Discharge. I prescribed additional prescription strength medication(s) as charted. I considered admission, but ultimately discharged patient after reassuring workup, improvement, outpatient treatment plan.      At the conclusion of the encounter I discussed the results of all the tests and the disposition. The questions were answered. The patient or family acknowledged understanding and was agreeable with the care plan.      MEDICATIONS GIVEN IN THE EMERGENCY DEPARTMENT:  Medications   Lidocaine (LIDOCARE) 4 % Patch 1 patch (1 patch Transdermal $Patch/Med Applied 9/8/24 5235)   ketorolac (TORADOL) injection 15 mg (15 mg Intravenous $Given 9/8/24 7611)       NEW PRESCRIPTIONS STARTED AT TODAY'S ED VISIT:  Discharge Medication List as of 9/8/2024  5:01 PM        START taking these medications    Details   lidocaine (LIDODERM) 5 % patch Place 1 patch over 12 hours onto the skin every 24 hours for 10 days. Apply to area of pain dailyDisp-10 patch, [id number]      naproxen (NAPROSYN) 500 MG tablet Take 1 tablet (500 mg) by mouth 2 times daily (with meals) for 10 days., Disp-20 tablet, R-0, E-Prescribe             HPI   HPI     Jennifer Llanos is a 69 year old female with a pertinent history of DM, osteoporosis, DM who presents to this ED via walk-in for evaluation of right thoracic back pain since yesterday, gradually worsening. Pt denies taking anything for pain.  It's moderate to severe, worse with palpation, movement or breathing.  Denies leg pain/swelling or a history of dvt/pe or travel.  Pt denies chest pain, but feels dyspneic due to pain on breathing.  Pt was moving boxes at home when this  "started.  No exact injury/trauma, but likely related.  No recent cough, congestion, fevers or other infectious concerns and pt denies any abdominal pain or n/v/d.    REVIEW OF SYSTEMS:  See HPI      Medical History     Past Medical History:   Diagnosis Date    Depression     Diabetes mellitus, type 2 (H)     Hyperlipidemia     Osteoporosis     Tobacco use disorder        Past Surgical History:   Procedure Laterality Date    ABDOMEN SURGERY      bariatric surgery      SECTION      GASTRECTOMY      HYSTERECTOMY      IR MISCELLANEOUS PROCEDURE  2013    IR URETER DILATION BILATERAL  2013    OPEN REDUCTION INTERNAL FIXATION HIP NAILING Left 2023    Procedure: INTERNAL FIXATION LEFT HIP USING INTRAMEDULLARY NAIL;  Surgeon: Oliver Celis MD;  Location: [facility] OR       Family History   Problem Relation Age of Onset    Cerebrovascular Disease Mother     CABG Father        Social History     Substance Use Topics    Alcohol use: Not Currently       lidocaine (LIDODERM) 5 % patch  naproxen (NAPROSYN) 500 MG tablet  acetaminophen (TYLENOL) 325 MG tablet  amitriptyline (ELAVIL) 10 MG tablet  atorvastatin (LIPITOR) 40 MG tablet  ferrous sulfate (FEROSUL) 325 (65 Fe) MG tablet  hydrOXYzine (ATARAX) 10 MG tablet  methocarbamol (ROBAXIN) 500 MG tablet  oxyCODONE (ROXICODONE) 5 MG tablet  pantoprazole (PROTONIX) 40 MG EC tablet  polyethylene glycol (MIRALAX) 17 GM/Dose powder  senna-docusate (SENOKOT-S/PERICOLACE) 8.6-50 MG tablet  sertraline (ZOLOFT) 50 MG tablet  sitaGLIPtin (JANUVIA) 100 MG tablet  Vitamin D3 (CHOLECALCIFEROL) 25 mcg (1000 units) tablet        Physical Exam     Vitals:  /74   Pulse 83   Temp 99.2  F (37.3  C)   Resp 19   Ht 1.651 m (5' 5\")   Wt 60.8 kg (134 lb)   SpO2 95%   BMI 22.30 kg/m      PHYSICAL EXAM:   Physical Exam  Vitals and nursing note reviewed.   Constitutional:       General: She is not in acute distress.     Appearance: Normal appearance.   HENT:      Head: " Normocephalic and atraumatic.      Nose: Nose normal.      Mouth/Throat:      Mouth: Mucous membranes are moist.   Eyes:      Pupils: Pupils are equal, round, and reactive to light.   Neck:      Vascular: No JVD.   Cardiovascular:      Rate and Rhythm: Normal rate and regular rhythm.      Pulses: Normal pulses.           Radial pulses are 2+ on the right side and 2+ on the left side.        Dorsalis pedis pulses are 2+ on the right side and 2+ on the left side.   Pulmonary:      Effort: Pulmonary effort is normal. No respiratory distress.      Breath sounds: Normal breath sounds.   Abdominal:      Palpations: Abdomen is soft.      Tenderness: There is no abdominal tenderness.   Musculoskeletal:      Cervical back: Full passive range of motion without pain and neck supple.      Comments: Focally tender right thoracic back below scapula. No rash, no swelling/deformity  No calf tenderness or swelling b/l   Skin:     General: Skin is warm.      Findings: No rash.   Neurological:      General: No focal deficit present.      Mental Status: She is alert. Mental status is at baseline.      Comments: Fluent speech, no acute lateralizing deficits   Psychiatric:         Mood and Affect: Mood normal.         Behavior: Behavior normal.           Results     LAB:  All pertinent labs reviewed and interpreted  Labs Ordered and Resulted from Time of ED Arrival to Time of ED Departure   BASIC METABOLIC PANEL - Abnormal       Result Value    Sodium 143      Potassium 4.9      Chloride 104      Carbon Dioxide (CO2) 23      Anion Gap 16 (*)     Urea Nitrogen 13.4      Creatinine 0.63      GFR Estimate >90      Calcium 9.7      Glucose 115 (*)    D DIMER QUANTITATIVE - Abnormal    D-Dimer Quantitative 0.51 (*)    CBC WITH PLATELETS AND DIFFERENTIAL - Abnormal    WBC Count 10.7      RBC Count 5.18      Hemoglobin 15.9 (*)     Hematocrit 48.2 (*)     MCV 93      MCH 30.7      MCHC 33.0      RDW 14.7      Platelet Count 215      %  Neutrophils 73      % Lymphocytes 17      % Monocytes 9      % Eosinophils 1      % Basophils 0      % Immature Granulocytes 1      NRBCs per 100 WBC 0      Absolute Neutrophils 7.9      Absolute Lymphocytes 1.8      Absolute Monocytes 1.0      Absolute Eosinophils 0.1      Absolute Basophils 0.0      Absolute Immature Granulocytes 0.1      Absolute NRBCs 0.0         RADIOLOGY:  XR Chest 2 Views   Final Result   IMPRESSION: There is a new mild infiltrative opacity left lower lobe posterior laterally most likely a mild pneumonia. Right lung is clear. Stable multiple compression fractures in the spine.                   ECG:  NSR, rate 88, no acute ischemia    I have independently reviewed and interpreted the EKG(s) documented above     PROCEDURES:  Procedures:  none      FINAL IMPRESSION:    ICD-10-CM    1. Acute right-sided thoracic back pain  M54.6       2. Atelectasis  J98.11           0 minutes of critical care time        Pipo Easley DO  Emergency Medicine  [facility] EMERGENCY ROOM  9/8/2024  3:31 PM          Pipo Easley MD  09/08/24 0947

## 2024-09-28 ENCOUNTER — HEALTH MAINTENANCE LETTER (OUTPATIENT)
Age: 69
End: 2024-09-28

## 2024-12-07 ENCOUNTER — HEALTH MAINTENANCE LETTER (OUTPATIENT)
Age: 69
End: 2024-12-07

## 2025-01-18 ENCOUNTER — HEALTH MAINTENANCE LETTER (OUTPATIENT)
Age: 70
End: 2025-01-18

## 2025-03-21 ENCOUNTER — LAB REQUISITION (OUTPATIENT)
Dept: LAB | Facility: CLINIC | Age: 70
End: 2025-03-21
Payer: MEDICARE

## 2025-03-21 DIAGNOSIS — E55.9 VITAMIN D DEFICIENCY, UNSPECIFIED: ICD-10-CM

## 2025-03-21 DIAGNOSIS — E78.5 HYPERLIPIDEMIA, UNSPECIFIED: ICD-10-CM

## 2025-03-21 LAB
CHOLEST SERPL-MCNC: 144 MG/DL
FASTING STATUS PATIENT QL REPORTED: NORMAL
HDLC SERPL-MCNC: 64 MG/DL
LDLC SERPL CALC-MCNC: 64 MG/DL
NONHDLC SERPL-MCNC: 80 MG/DL
TRIGL SERPL-MCNC: 78 MG/DL
VIT D+METAB SERPL-MCNC: 16 NG/ML (ref 20–50)

## 2025-03-21 PROCEDURE — 80053 COMPREHEN METABOLIC PANEL: CPT | Mod: ORL | Performed by: STUDENT IN AN ORGANIZED HEALTH CARE EDUCATION/TRAINING PROGRAM

## 2025-03-21 PROCEDURE — 80061 LIPID PANEL: CPT | Mod: ORL | Performed by: STUDENT IN AN ORGANIZED HEALTH CARE EDUCATION/TRAINING PROGRAM

## 2025-03-21 PROCEDURE — 82306 VITAMIN D 25 HYDROXY: CPT | Mod: ORL | Performed by: STUDENT IN AN ORGANIZED HEALTH CARE EDUCATION/TRAINING PROGRAM

## 2025-03-22 LAB
ALBUMIN SERPL BCG-MCNC: 4.1 G/DL (ref 3.5–5.2)
ALP SERPL-CCNC: 71 U/L (ref 40–150)
ALT SERPL W P-5'-P-CCNC: 17 U/L (ref 0–50)
ANION GAP SERPL CALCULATED.3IONS-SCNC: 11 MMOL/L (ref 7–15)
AST SERPL W P-5'-P-CCNC: 26 U/L (ref 0–45)
BILIRUB SERPL-MCNC: 0.5 MG/DL
BUN SERPL-MCNC: 11.1 MG/DL (ref 8–23)
CALCIUM SERPL-MCNC: 9.4 MG/DL (ref 8.8–10.4)
CHLORIDE SERPL-SCNC: 107 MMOL/L (ref 98–107)
CREAT SERPL-MCNC: 0.66 MG/DL (ref 0.51–0.95)
EGFRCR SERPLBLD CKD-EPI 2021: >90 ML/MIN/1.73M2
FASTING STATUS PATIENT QL REPORTED: ABNORMAL
GLUCOSE SERPL-MCNC: 137 MG/DL (ref 70–99)
HCO3 SERPL-SCNC: 22 MMOL/L (ref 22–29)
POTASSIUM SERPL-SCNC: 4.6 MMOL/L (ref 3.4–5.3)
PROT SERPL-MCNC: 7 G/DL (ref 6.4–8.3)
SODIUM SERPL-SCNC: 140 MMOL/L (ref 135–145)

## 2025-04-26 ENCOUNTER — HEALTH MAINTENANCE LETTER (OUTPATIENT)
Age: 70
End: 2025-04-26

## 2025-08-09 ENCOUNTER — HEALTH MAINTENANCE LETTER (OUTPATIENT)
Age: 70
End: 2025-08-09

## (undated) DEVICE — DRAPE C-ARMOR 5 SIDED 5523

## (undated) DEVICE — PREP CHLORAPREP 26ML TINTED HI-LITE ORANGE 930815

## (undated) DEVICE — KIT PATIENT CARE HANA TABLE PROFX SUPINE 6855

## (undated) DEVICE — LINEN TOWEL PACK X5 5464

## (undated) DEVICE — SU VICRYL 0 CT-1 27" J340H

## (undated) DEVICE — SU VICRYL 2-0 CT-1 27" UND J259H

## (undated) DEVICE — WIRE GUIDE 3.2X400MM  357.399

## (undated) DEVICE — SU VICRYL 0 CT-1 27" UND J260H

## (undated) DEVICE — SUCTION MANIFOLD NEPTUNE SGL

## (undated) DEVICE — SU VICRYL 2-0 CT-2 27" UND J269H

## (undated) DEVICE — GOWN IMPERVIOUS SPECIALTY XL/XLONG 39049

## (undated) DEVICE — DRILL BIT QUICK COUPLING 3 FLUTE 4.2MMX330/100MM CALIBRATE

## (undated) DEVICE — DRAPE C-ARM 60X42" 1013

## (undated) DEVICE — SOL NACL 0.9% IRRIG 1000ML BOTTLE 2F7124

## (undated) DEVICE — SU MONOCRYL 3-0 PS-2 27" Y427H

## (undated) DEVICE — GLOVE BIOGEL PI MICRO INDICATOR UNDERGLOVE SZ 7.5 48975

## (undated) DEVICE — SU VICRYL 2-0 FS-1 27" UND  J443H

## (undated) DEVICE — DRILL BIL CANNULATED 6MM/9MM 03.037.022

## (undated) DEVICE — GLOVE BIOGEL PI ULTRATOUCH SZ 7.5 41175

## (undated) DEVICE — SOL WATER IRRIG 1000ML BOTTLE 2F7114

## (undated) DEVICE — GLOVE BIOGEL PI ORTHOPRO SZ 7.5 47675

## (undated) RX ORDER — PROPOFOL 10 MG/ML
INJECTION, EMULSION INTRAVENOUS
Status: DISPENSED
Start: 2023-07-24

## (undated) RX ORDER — EPHEDRINE SULFATE 50 MG/ML
INJECTION, SOLUTION INTRAMUSCULAR; INTRAVENOUS; SUBCUTANEOUS
Status: DISPENSED
Start: 2023-07-24

## (undated) RX ORDER — CEFAZOLIN SODIUM/WATER 2 G/20 ML
SYRINGE (ML) INTRAVENOUS
Status: DISPENSED
Start: 2023-07-24

## (undated) RX ORDER — DEXAMETHASONE SODIUM PHOSPHATE 4 MG/ML
INJECTION, SOLUTION INTRA-ARTICULAR; INTRALESIONAL; INTRAMUSCULAR; INTRAVENOUS; SOFT TISSUE
Status: DISPENSED
Start: 2023-07-24

## (undated) RX ORDER — HYDROMORPHONE HCL IN WATER/PF 6 MG/30 ML
PATIENT CONTROLLED ANALGESIA SYRINGE INTRAVENOUS
Status: DISPENSED
Start: 2023-07-24

## (undated) RX ORDER — FENTANYL CITRATE 50 UG/ML
INJECTION, SOLUTION INTRAMUSCULAR; INTRAVENOUS
Status: DISPENSED
Start: 2023-07-24

## (undated) RX ORDER — HYDROMORPHONE HYDROCHLORIDE 1 MG/ML
INJECTION, SOLUTION INTRAMUSCULAR; INTRAVENOUS; SUBCUTANEOUS
Status: DISPENSED
Start: 2023-07-24